# Patient Record
Sex: FEMALE | Race: WHITE | Employment: OTHER | URBAN - METROPOLITAN AREA
[De-identification: names, ages, dates, MRNs, and addresses within clinical notes are randomized per-mention and may not be internally consistent; named-entity substitution may affect disease eponyms.]

---

## 2017-10-08 ENCOUNTER — HOSPITAL ENCOUNTER (EMERGENCY)
Age: 82
Discharge: HOME | End: 2017-10-08
Attending: EMERGENCY MEDICINE
Payer: MEDICARE

## 2017-10-08 ENCOUNTER — APPOINTMENT (OUTPATIENT)
Dept: CT IMAGING | Age: 82
End: 2017-10-08
Attending: EMERGENCY MEDICINE
Payer: MEDICARE

## 2017-10-08 ENCOUNTER — APPOINTMENT (OUTPATIENT)
Dept: GENERAL RADIOLOGY | Age: 82
End: 2017-10-08
Attending: EMERGENCY MEDICINE
Payer: MEDICARE

## 2017-10-08 VITALS
SYSTOLIC BLOOD PRESSURE: 161 MMHG | DIASTOLIC BLOOD PRESSURE: 65 MMHG | TEMPERATURE: 97.9 F | HEART RATE: 68 BPM | OXYGEN SATURATION: 98 % | HEIGHT: 59 IN | BODY MASS INDEX: 24.19 KG/M2 | RESPIRATION RATE: 20 BRPM | WEIGHT: 120 LBS

## 2017-10-08 DIAGNOSIS — S61.411A LACERATION OF RIGHT HAND WITHOUT FOREIGN BODY, INITIAL ENCOUNTER: ICD-10-CM
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
## 2017-10-08 DIAGNOSIS — S70.12XA CONTUSION OF LEFT THIGH, INITIAL ENCOUNTER: ICD-10-CM

## 2017-10-08 DIAGNOSIS — S01.01XA LACERATION OF SCALP, INITIAL ENCOUNTER: ICD-10-CM

## 2017-10-08 PROCEDURE — 75810000293 HC SIMP/SUPERF WND  RPR

## 2017-10-08 PROCEDURE — 77030002916 HC SUT ETHLN J&J -A

## 2017-10-08 PROCEDURE — 70450 CT HEAD/BRAIN W/O DYE: CPT

## 2017-10-08 PROCEDURE — 74011000250 HC RX REV CODE- 250: Performed by: EMERGENCY MEDICINE

## 2017-10-08 PROCEDURE — 74011250637 HC RX REV CODE- 250/637: Performed by: EMERGENCY MEDICINE

## 2017-10-08 PROCEDURE — 99284 EMERGENCY DEPT VISIT MOD MDM: CPT

## 2017-10-08 PROCEDURE — 73552 X-RAY EXAM OF FEMUR 2/>: CPT

## 2017-10-08 PROCEDURE — 77030008460 HC STPLR SKN PRECIS 3M -A

## 2017-10-08 RX ORDER — LIDOCAINE HYDROCHLORIDE 20 MG/ML
10 INJECTION, SOLUTION EPIDURAL; INFILTRATION; INTRACAUDAL; PERINEURAL
Status: COMPLETED | OUTPATIENT
Start: 2017-10-08 | End: 2017-10-08

## 2017-10-08 RX ADMIN — BACITRACIN ZINC, NEOMYCIN SULFATE, POLYMYXIN B SULFATE 1 PACKET: 3.5; 5000; 4 OINTMENT TOPICAL at 23:05

## 2017-10-08 RX ADMIN — Medication 2 ML: at 22:05

## 2017-10-08 RX ADMIN — LIDOCAINE HYDROCHLORIDE 200 MG: 20 INJECTION, SOLUTION EPIDURAL; INFILTRATION; INTRACAUDAL; PERINEURAL at 22:05

## 2017-10-09 NOTE — DISCHARGE INSTRUCTIONS
We hope that we have addressed all of your medical concerns. The examination and treatment you received in the Emergency Department were for an emergent problem and were not intended as complete care. It is important that you follow up with your healthcare provider(s) for ongoing care. If your symptoms worsen or do not improve as expected, and you are unable to reach your usual health care provider(s), you should return to the Emergency Department. Today's healthcare is undergoing tremendous change, and patient satisfaction surveys are one of the many tools to assess the quality of medical care. You may receive a survey from the [place] regarding your experience in the Emergency Department. I hope that your experience has been completely positive, particularly the medical care that I provided. As such, please participate in the survey; anything less than excellent does not meet my expectations or intentions. [facility] and [address] [place] [address] participate in nationally recognized quality of care measures. If your blood pressure is greater than 120/80, as reported below, we urge that you seek medical care to address the potential of high blood pressure, commonly known as hypertension. Hypertension can be hereditary or can be caused by certain medical conditions, pain, stress, or \"white coat syndrome. \"       Please make an appointment with your health care provider(s) for follow up of your Emergency Department visit. VITALS:   Patient Vitals for the past 8 hrs:   Temp Pulse Resp BP SpO2   10/08/17 2112 97.9 °F (36.6 °C) 68 20 161/65 98 %          Thank you for allowing us to provide you with medical care today. We realize that you have many choices for your emergency care needs. Please choose us in the future for any continued health care needs. Jaime Mcguire, [address]. Office: [phone]            No results found for this or any previous visit (from the past 24 hour(s)). Xr Femur Lt 2 V    Result Date: 10/8/2017  EXAM:  XR FEMUR LT 2 V Clinical history: Fall INDICATION:   fall. COMPARISON: None. FINDINGS: Two views of the left femur demonstrate no fracture or other acute osseous, articular or soft tissue abnormality. IMPRESSION:  No acute abnormality. Ct Head Wo Cont    Result Date: 10/8/2017  EXAM:  CT HEAD WO CONT INDICATION: Imbalance, fall, head injury, occipital scalp laceration. COMPARISON: CT head on 11/15/2015. TECHNIQUE: Noncontrast head CT. Coronal and sagittal reformats. CT dose reduction was achieved through the use of a standardized protocol tailored for this examination and automatic exposure control for dose modulation. FINDINGS: The ventricles and sulci are age-appropriate without hydrocephalus. There is no mass effect or midline shift. There is no intracranial hemorrhage or extra-axial fluid collection. Hypoattenuation in the right frontal lobe. Sylvian region does not include loss of gray-white differentiation. Finding is new since 2015. No other evidence of infarct. The calvarium is intact. The visualized paranasal sinuses and mastoid air cells are clear. IMPRESSION: 1. No intracranial hemorrhage. 2. 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 Preventing Falls: Care Instructions  Your Care Instructions  Getting around your home safely can be a challenge if you have injuries or health problems that make it easy for you to fall. Loose rugs and furniture in walkways are among the dangers for many older people who have problems walking or who have poor eyesight. People who have conditions such as arthritis, osteoporosis, or dementia also have to be careful not to fall. You can make your home safer with a few simple measures. Follow-up care is a key part of your treatment and safety.  Be sure to make and go to all appointments, and call your doctor if you are having problems. It's also a good idea to know your test results and keep a list of the medicines you take. How can you care for yourself at home? Taking care of yourself  · You may get dizzy if you do not drink enough water. To prevent dehydration, drink plenty of fluids, enough so that your urine is light yellow or clear like water. Choose water and other caffeine-free clear liquids. If you have kidney, heart, or liver disease and have to limit fluids, talk with your doctor before you increase the amount of fluids you drink. · Exercise regularly to improve your strength, muscle tone, and balance. Walk if you can. Swimming may be a good choice if you cannot walk easily. · Have your vision and hearing checked each year or any time you notice a change. If you have trouble seeing and hearing, you might not be able to avoid objects and could lose your balance. · Know the side effects of the medicines you take. Ask your doctor or pharmacist whether the medicines you take can affect your balance. Sleeping pills or sedatives can affect your balance. · Limit the amount of alcohol you drink. Alcohol can impair your balance and other senses. · Ask your doctor whether calluses or corns on your feet need to be removed. If you wear loose-fitting shoes because of calluses or corns, you can lose your balance and fall. · Talk to your doctor if you have numbness in your feet. Preventing falls at home  · Remove raised doorway thresholds, throw rugs, and clutter. Repair loose carpet or raised areas in the floor. · Move furniture and electrical cords to keep them out of walking paths. · Use nonskid floor wax, and wipe up spills right away, especially on ceramic tile floors. · If you use a walker or cane, put rubber tips on it. If you use crutches, clean the bottoms of them regularly with an abrasive pad, such as steel wool.   · Keep your house well lit, especially stairways, porches, and outside walkways. Use night-lights in areas such as hallways and bathrooms. Add extra light switches or use remote switches (such as switches that go on or off when you clap your hands) to make it easier to turn lights on if you have to get up during the night. · Install sturdy handrails on stairways. · Move items in your cabinets so that the things you use a lot are on the lower shelves (about waist level). · Keep a cordless phone and a flashlight with new batteries by your bed. If possible, put a phone in each of the main rooms of your house, or carry a cell phone in case you fall and cannot reach a phone. Or, you can wear a device around your neck or wrist. You push a button that sends a signal for help. · Wear low-heeled shoes that fit well and give your feet good support. Use footwear with nonskid soles. Check the heels and soles of your shoes for wear. Repair or replace worn heels or soles. · Do not wear socks without shoes on wood floors. · Walk on the grass when the sidewalks are slippery. If you live in an area that gets snow and ice in the winter, sprinkle salt on slippery steps and sidewalks. Preventing falls in the bath  · Install grab bars and nonskid mats inside and outside your shower or tub and near the toilet and sinks. · Use shower chairs and bath benches. · Use a hand-held shower head that will allow you to sit while showering. · Get into a tub or shower by putting the weaker leg in first. Get out of a tub or shower with your strong side first.  · Repair loose toilet seats and consider installing a raised toilet seat to make getting on and off the toilet easier. · Keep your bathroom door unlocked while you are in the shower. Where can you learn more? Go to http://fritz-darrell.info/. Enter 0476 [id number] in the search box to learn more about \"Preventing Falls: Care Instructions. \"  Current as of: August 4, 2016  Content Version: 11.3  © 4498-5244 [employer]. Care instructions adapted under license by [employer] (which disclaims liability or warranty for this information). If you have questions about a medical condition or this instruction, always ask your healthcare professional. [address] any warranty or liability for your use of this information. Cuts on the Hand Closed With Stitches: Care Instructions  Your Care Instructions    A cut on your hand can be on your fingers, your thumb, or the front or back of your hand. Sometimes a cut can injure the tendons, blood vessels, or nerves of your hand. The doctor used stitches to close the cut. Using stitches also helps the cut heal and reduces scarring. The doctor may have given you a splint to help prevent you from moving your hand, fingers, or thumb. If the cut went deep and through the skin, the doctor put in two layers of stitches. The deeper layer brings the deep part of the cut together. These stitches will dissolve and don't need to be removed. The stitches in the upper layer are the ones you see on the cut. You will probably have a bandage. You will need to have the stitches removed, usually in 7 to 14 days. The doctor may suggest that you see a hand specialist if the cut is very deep or if you have trouble moving your fingers or have less feeling in your hand. The doctor has checked you carefully, but problems can develop later. If you notice any problems or new symptoms, get medical treatment right away. Follow-up care is a key part of your treatment and safety. Be sure to make and go to all appointments, and call your doctor if you are having problems. It's also a good idea to know your test results and keep a list of the medicines you take. How can you care for yourself at home? · Keep the cut dry for the first 24 to 48 hours. After this, you can shower if your doctor okays it. Pat the cut dry.   · Don't soak the cut, such as in a bathtub. Your doctor will tell you when it's safe to get the cut wet. · If your doctor told you how to care for your cut, follow your doctor's instructions. If you did not get instructions, follow this general advice:  ¨ After the first 24 to 48 hours, wash around the cut with clean water 2 times a day. Don't use hydrogen peroxide or alcohol, which can slow healing. ¨ You may cover the cut with a thin layer of petroleum jelly, such as Vaseline, and a nonstick bandage. ¨ Apply more petroleum jelly and replace the bandage as needed. · Prop up the sore hand on a pillow anytime you sit or lie down during the next 3 days. Try to keep it above the level of your heart. This will help reduce swelling. · Avoid any activity that could cause your cut to reopen. · Do not remove the stitches on your own. Your doctor will tell you when to come back to have the stitches removed. · Be safe with medicines. Take pain medicines exactly as directed. ¨ If the doctor gave you a prescription medicine for pain, take it as prescribed. ¨ If you are not taking a prescription pain medicine, ask your doctor if you can take an over-the-counter medicine. When should you call for help? Call your doctor now or seek immediate medical care if:  · You have new pain, or your pain gets worse. · The skin near the cut is cold or pale or changes color. · You have tingling, weakness, or numbness near the cut. · The cut starts to bleed, and blood soaks through the bandage. Oozing small amounts of blood is normal.  · You have trouble moving the area of the hand near the cut. · You have symptoms of infection, such as:  ¨ Increased pain, swelling, warmth, or redness around the cut. ¨ Red streaks leading from the cut. ¨ Pus draining from the cut. ¨ A fever. Watch closely for changes in your health, and be sure to contact your doctor if:  · You do not get better as expected. Where can you learn more?   Go to http://fritz-darrell.info/. Enter T250 in the search box to learn more about \"Cuts on the Hand Closed With Stitches: Care Instructions. \"  Current as of: March 20, 2017  Content Version: 11.3  © 6485-2514 [employer]. Care instructions adapted under license by [employer] (which disclaims liability or warranty for this information). If you have questions about a medical condition or this instruction, always ask your healthcare professional. [address] any warranty or liability for your use of this information. Cuts Closed With Staples: Care Instructions  Your Care Instructions  A cut can happen anywhere on your body. The doctor used staples to close the cut. Staples easily and quickly close a cut, which helps the cut heal.  Sometimes a cut can injure tendons, blood vessels, or nerves. If the cut went deep and through the skin, the doctor may have put in a layer of stitches below the staples. The deeper layer of stitches brings the deep part of the cut together. These stitches will dissolve and don't need to be removed. The staples in the upper layer are what you see on the cut. You may have a bandage. You will need to have the staples removed, usually in 7 to 14 days. The doctor has checked you carefully, but problems can develop later. If you notice any problems or new symptoms, get medical treatment right away. Follow-up care is a key part of your treatment and safety. Be sure to make and go to all appointments, and call your doctor if you are having problems. It's also a good idea to know your test results and keep a list of the medicines you take. How can you care for yourself at home? · Keep the cut dry for the first 24 to 48 hours. After this, you can shower if your doctor okays it. Pat the cut dry. · Don't soak the cut, such as in a bathtub. Your doctor will tell you when it's safe to get the cut wet.   · If your doctor told you how to care for your cut, follow your doctor's instructions. If you did not get instructions, follow this general advice:  ¨ After the first 24 to 48 hours, wash around the cut with clean water 2 times a day. Don't use hydrogen peroxide or alcohol, which can slow healing. ¨ You may cover the cut with a thin layer of petroleum jelly, such as Vaseline, and a nonstick bandage. ¨ Apply more petroleum jelly and replace the bandage as needed. · Avoid any activity that could cause your cut to reopen. · Do not remove the staples on your own. Your doctor will tell you when to come back to have the staples removed. · Take pain medicines exactly as directed. ¨ If the doctor gave you a prescription medicine for pain, take it as prescribed. ¨ If you are not taking a prescription pain medicine, ask your doctor if you can take an over-the-counter medicine. When should you call for help? Call your doctor now or seek immediate medical care if:  · You have new pain, or your pain gets worse. · The skin near the cut is cold or pale or changes color. · You have tingling, weakness, or numbness near the cut. · The cut starts to bleed, and blood soaks through the bandage. Oozing small amounts of blood is normal.  · You have trouble moving the area near the cut. · You have symptoms of infection, such as:  ¨ Increased pain, swelling, warmth, or redness around the cut. ¨ Red streaks leading from the cut. ¨ Pus draining from the cut. ¨ A fever. Watch closely for changes in your health, and be sure to contact your doctor if:  · You do not get better as expected. Where can you learn more? Go to http://fritz-darrell.info/. Enter Q840 in the search box to learn more about \"Cuts Closed With Staples: Care Instructions. \"  Current as of: March 20, 2017  Content Version: 11.3  © 3206-6982 [employer].  Care instructions adapted under license by [employer] (which disclaims liability or warranty for this information). If you have questions about a medical condition or this instruction, always ask your healthcare professional. [place] [address] any warranty or liability for your use of this information.

## 2017-10-09 NOTE — ED TRIAGE NOTES
Pt lost balance and hit head. Laceration to back of head and to right hand. MD at bedside to examine pt. Tenderness to left leg.

## 2017-10-09 NOTE — ED TRIAGE NOTES
Patient arrives with bleeding to scalp and right wrist after fall.  Patient was taken to room for triage

## 2017-10-09 NOTE — ED NOTES
Discharge instructions given by provider to son. Son verbalized an understanding, has no further questions at this time.

## 2017-10-09 NOTE — ED PROVIDER NOTES
HPI Comments: 80 y.o. female with past medical history significant for diverticulitis, HTN, anxiety, dementia, who presents accompanied by family member from memory care facility, for evaluation after a ground level fall that occurred PTA. Son reports that pt was \"dancing\" with another resident at the [facility] where she lives. Pt lost her balance and fell, and now has wounds to the posterior scalp and ulnar aspect of the right hand. Pt has been able to ambulate since the fall occurred, but she is complaining of pain upon palpation of the left upper leg. Son denies pt taking any medications for anticoagulation. There are no other known injuries. Social hx: - Tobacco, - EtOH, - Illicit Drug Abuse   Patient is a resident of a memory care facility. PCP: Taryn Munson MD    Full history, physical exam, and ROS unable to be obtained due to:  dementia. Note written by Cat Velazquez. Tom Frey, as dictated by Rudy Cruz, DO 9:13 PM      The history is provided by the patient and a relative. The history is limited by the condition of the patient (h/o dementia). No  was used. Past Medical History:   Diagnosis Date    Diverticulitis     Gastrointestinal disorder diverticulitis    Hypertension     Psychiatric disorder     anxiety       Past Surgical History:   Procedure Laterality Date    ABDOMEN SURGERY PROC UNLISTED  unable to recall    colon resection         No family history on file. Social History     Social History    Marital status:      Spouse name: N/A    Number of children: N/A    Years of education: N/A     Occupational History    Not on file.      Social History Main Topics    Smoking status: Never Smoker    Smokeless tobacco: Not on file    Alcohol use No    Drug use: No    Sexual activity: Not Currently     Other Topics Concern    Not on file     Social History Narrative     ALLERGIES: Dilaudid [hydromorphone (bulk)]; Keflex [cephalexin]; and Morphine    Review of Systems   Unable to perform ROS: Dementia       Vitals:    10/08/17 2112   BP: 161/65   Pulse: 68   Resp: 20   Temp: 97.9 °F (36.6 °C)   SpO2: 98%   Weight: 54.4 kg (120 lb)   Height: 4' 11\" (1.499 m)            Physical Exam   Constitutional: She appears well-developed and well-nourished. No distress. HENT:   Head: Normocephalic. Head is with laceration. Right Ear: External ear normal.   Left Ear: External ear normal.   Nose: Nose normal.   Mouth/Throat: Oropharynx is clear and moist. No oropharyngeal exudate. Eyes: Conjunctivae and EOM are normal. Pupils are equal, round, and reactive to light. Right eye exhibits no discharge. Left eye exhibits no discharge. No scleral icterus. Neck: Normal range of motion. Neck supple. No JVD present. No tracheal deviation present. Cardiovascular: Normal rate, regular rhythm, normal heart sounds and intact distal pulses. Exam reveals no gallop and no friction rub. No murmur heard. Pulmonary/Chest: Effort normal and breath sounds normal. No stridor. No respiratory distress. She has no decreased breath sounds. She has no wheezes. She has no rhonchi. She has no rales. She exhibits no tenderness. Abdominal: Soft. Bowel sounds are normal. She exhibits no distension. There is no tenderness. There is no rebound and no guarding. Musculoskeletal: Normal range of motion. She exhibits no edema. Right hand: She exhibits laceration (to ulnar aspect of right hand). Hands:       Left upper leg: She exhibits tenderness (tenderness to left thigh). Neurological: She is alert. She has normal strength and normal reflexes. No cranial nerve deficit or sensory deficit. She exhibits normal muscle tone. Coordination normal. GCS eye subscore is 4. GCS verbal subscore is 5. GCS motor subscore is 6. She is disoriented (per son patient is at baseline)   Skin: Skin is warm and dry. No rash noted. She is not diaphoretic.  No erythema. No pallor. Psychiatric: She has a normal mood and affect. Her behavior is normal. Judgment and thought content normal.   Nursing note and vitals reviewed. Note written by Nelson Aldridge. Edgar Person, as dictated by Marcel Jean Baptiste DO 9:13 PM      MDM  Number of Diagnoses or Management Options  Contusion of left thigh, initial encounter:   Fall, initial encounter:   Laceration of right hand without foreign body, initial encounter:   Laceration of scalp, initial encounter:     ED Course       Wound Repair  Date/Time: 10/8/2017 10:25 PM  Performed by: attendingPreparation: skin prepped with Betadine  Pre-procedure re-eval: Immediately prior to the procedure, the patient was reevaluated and found suitable for the planned procedure and any planned medications. Time out: Immediately prior to the procedure a time out was called to verify the correct patient, procedure, equipment, staff and marking as appropriate. .  Location details: scalp  Wound length:2.5 cm or less (1 cm)  Anesthesia: see MAR for details    Anesthesia:  Local Anesthetic: LET (lido,epi,tetracaine)  Anesthetic total: 2 mL  Foreign bodies: no foreign bodies  Debridement: none  Skin closure: staples  Number of sutures: 3  Approximation: close  Dressing: antibiotic ointment  Patient tolerance: Patient tolerated the procedure well with no immediate complications  My total time at bedside, performing this procedure was 1-15 minutes (10 minutes).   Wound Repair  Date/Time: 10/8/2017 10:35 PM  Performed by: attendingPreparation: skin prepped with Betadine  Location details: right hand  Wound length:2.5 cm or less (2 cm)  Anesthesia: local infiltration    Anesthesia:  Local Anesthetic: lidocaine 2% without epinephrine  Anesthetic total: 2 mL  Foreign bodies: no foreign bodies  Irrigation solution: saline  Debridement: none  Skin closure: 5-0 nylon  Number of sutures: 4  Technique: simple and interrupted  Approximation: close  Dressing: antibiotic ointment  Patient tolerance: Patient tolerated the procedure well with no immediate complications  My total time at bedside, performing this procedure was 1-15 minutes (14 minutes). Chief Complaint   Patient presents with    Head Laceration    Hand Laceration       12:08 AM  The patients presenting problems have been discussed, and they are in agreement with the care plan formulated and outlined with them. I have encouraged them to ask questions as they arise throughout their visit. MEDICATIONS GIVEN:  Medications   lidocaine (PF) (XYLOCAINE) 20 mg/mL (2 %) injection 200 mg (200 mg IntraDERMal Given 10/8/17 2205)   lidocaine/EPINEPHrine/tetracaine/methylcellulose (LET) topical gel gel 2 mL (2 mL Topical Given 10/8/17 2205)   neomycin-bacitracnZn-polymyxnB (NEOSPORIN) ointment 1 Packet (1 Packet Topical Given 10/8/17 2305)       LABS REVIEWED:  No results found for this or any previous visit (from the past 24 hour(s)). VITAL SIGNS:  Patient Vitals for the past 12 hrs:   Temp Pulse Resp BP SpO2   10/08/17 2112 97.9 °F (36.6 °C) 68 20 161/65 98 %       RADIOLOGY RESULTS:  The following have been ordered and reviewed:  XR FEMUR LT 2 V   Final Result      CT HEAD WO CONT   Final Result        Study Result      EXAM:  CT HEAD WO CONT     INDICATION: Imbalance, fall, head injury, occipital scalp laceration.     COMPARISON: CT head on 11/15/2015.     TECHNIQUE: Noncontrast head CT. Coronal and sagittal reformats. CT dose  reduction was achieved through the use of a standardized protocol tailored for  this examination and automatic exposure control for dose modulation.     FINDINGS: The ventricles and sulci are age-appropriate without hydrocephalus. There is no mass effect or midline shift. There is no intracranial hemorrhage or  extra-axial fluid collection. Hypoattenuation in the right frontal lobe. Sylvian  region does not include loss of gray-white differentiation. Finding is new since  2015. No other evidence of infarct.     The calvarium is intact. The visualized paranasal sinuses and mastoid air cells  are clear.     IMPRESSION  IMPRESSION:   1. No intracranial hemorrhage. 2. Age-indeterminate right frontal lobe infarct is new since 2015 but most  likely nonacute. Study Result      EXAM:  XR FEMUR LT 2 V  Clinical history: Fall     INDICATION:   fall.     COMPARISON: None.     FINDINGS: Two views of the left femur demonstrate no fracture or other acute  osseous, articular or soft tissue abnormality.     IMPRESSION  IMPRESSION:  No acute abnormality. PROGRESS NOTES:  Discussed results and plan with patient's son. Patient will be discharged back to nursing facility with PCP followup. Patient instructed to return to the emergency room for any worsening symptoms or any other concerns. DIAGNOSIS:    1. Fall, initial encounter    2. Laceration of right hand without foreign body, initial encounter    3. Contusion of left thigh, initial encounter    4. Laceration of scalp, initial encounter        PLAN:  Follow-up Information     Follow up With Details Comments [address], [place] In 1 week For suture removal [address] [place] [address]      [facility] DEPT  If symptoms worsen [facility]  [phone]        Discharge Medication List as of 10/8/2017 11:01 PM      CONTINUE these medications which have NOT CHANGED    Details   !! QUEtiapine (SEROQUEL) 25 mg tablet Take 37.5 mg by mouth nightly., Historical Med      LORazepam (ATIVAN) 0.5 mg tablet Take 1 mg by mouth daily as needed for Anxiety. , Historical Med      bisacodyl (BISCOLAX) 10 mg suppository Insert 10 mg into rectum daily as needed., Historical Med      docusate sodium (COLACE) 100 mg capsule Take 100 mg by mouth two (2) times a day., Historical Med      glycerin, Adult, suppository Insert 1 Suppository into rectum daily as needed., Historical Med      hydrOXYzine (ATARAX) 25 mg tablet Take 12.5 mg by mouth daily. , Historical Med      alum-mag hydroxide-simeth (MAALOX ADVANCED) 200-200-20 mg/5 mL susp Take 15 mL by mouth four (4) times daily as needed (Indigestion). , Historical Med      !! QUEtiapine (SEROQUEL) 25 mg tablet Take 12.5 mg by mouth two (2) times a day. Dementia with behaviors, Historical Med      therapeutic multivitamin (THERA) tablet Take 1 Tab by mouth daily. , Historical Med      neomycin-bacitracin-polymyxin (TRIPLE ANTIBIOTIC) 3.5-400-5,000 mg-unit-unit/g ointment Apply  to affected area daily as needed., Historical Med      acetaminophen (ACETAMINOPHEN EXTRA STRENGTH) 500 mg tablet Take 500 mg by mouth as needed for Pain., Historical Med      cholecalciferol (VITAMIN D3) 1,000 unit tablet Take 1,000 Units by mouth daily. , Historical Med      ranitidine (ZANTAC) 150 mg tablet Take 150 mg by mouth two (2) times a day., Historical Med      polyethylene glycol (MIRALAX) 17 gram packet Take 17 g by mouth two (2) times a day.  , Historical Med      aspirin 81 mg chewable tablet Take 81 mg by mouth daily. , Historical Med       !! - Potential duplicate medications found. Please discuss with provider. ED COURSE: The patients hospital course has been uncomplicated.

## 2018-03-24 ENCOUNTER — HOSPITAL ENCOUNTER (EMERGENCY)
Age: 83
Discharge: NURSING, REHAB OR LONG-TERM CARE | End: 2018-03-25
Attending: EMERGENCY MEDICINE | Admitting: EMERGENCY MEDICINE
Payer: MEDICARE

## 2018-03-24 ENCOUNTER — APPOINTMENT (OUTPATIENT)
Dept: GENERAL RADIOLOGY | Age: 83
End: 2018-03-24
Attending: EMERGENCY MEDICINE
Payer: MEDICARE

## 2018-03-24 VITALS
TEMPERATURE: 97.7 F | OXYGEN SATURATION: 95 % | RESPIRATION RATE: 19 BRPM | SYSTOLIC BLOOD PRESSURE: 192 MMHG | WEIGHT: 120 LBS | HEART RATE: 65 BPM | DIASTOLIC BLOOD PRESSURE: 80 MMHG | BODY MASS INDEX: 24.24 KG/M2

## 2018-03-24 DIAGNOSIS — S52.022A CLOSED FRACTURE OF LEFT OLECRANON PROCESS, INITIAL ENCOUNTER: Primary | ICD-10-CM

## 2018-03-24 DIAGNOSIS — S51.019A SKIN TEAR OF ELBOW WITHOUT COMPLICATION, INITIAL ENCOUNTER: ICD-10-CM

## 2018-03-24 PROCEDURE — 73080 X-RAY EXAM OF ELBOW: CPT

## 2018-03-24 PROCEDURE — 75810000053 HC SPLINT APPLICATION

## 2018-03-24 PROCEDURE — 74011250637 HC RX REV CODE- 250/637: Performed by: EMERGENCY MEDICINE

## 2018-03-24 PROCEDURE — 99282 EMERGENCY DEPT VISIT SF MDM: CPT

## 2018-03-24 PROCEDURE — 77030028224 HC PDNG CST BSNM -A

## 2018-03-24 RX ORDER — DOXYCYCLINE HYCLATE 100 MG
100 TABLET ORAL 2 TIMES DAILY
Qty: 10 TAB | Refills: 0 | Status: SHIPPED | OUTPATIENT
Start: 2018-03-24 | End: 2018-03-24

## 2018-03-24 RX ORDER — DOXYCYCLINE 25 MG/5ML
100 POWDER, FOR SUSPENSION ORAL EVERY 12 HOURS
Qty: 200 ML | Refills: 0 | Status: SHIPPED | OUTPATIENT
Start: 2018-03-24 | End: 2018-03-29

## 2018-03-24 RX ORDER — DOXYCYCLINE HYCLATE 100 MG
100 TABLET ORAL
Status: DISCONTINUED | OUTPATIENT
Start: 2018-03-24 | End: 2018-03-25 | Stop reason: HOSPADM

## 2018-03-25 ENCOUNTER — HOSPITAL ENCOUNTER (EMERGENCY)
Age: 83
Discharge: HOME | End: 2018-03-25
Attending: EMERGENCY MEDICINE
Payer: MEDICARE

## 2018-03-25 ENCOUNTER — APPOINTMENT (OUTPATIENT)
Dept: CT IMAGING | Age: 83
End: 2018-03-25
Attending: EMERGENCY MEDICINE
Payer: MEDICARE

## 2018-03-25 VITALS
DIASTOLIC BLOOD PRESSURE: 47 MMHG | HEIGHT: 59 IN | WEIGHT: 120 LBS | BODY MASS INDEX: 24.19 KG/M2 | TEMPERATURE: 98.4 F | RESPIRATION RATE: 16 BRPM | SYSTOLIC BLOOD PRESSURE: 175 MMHG | HEART RATE: 62 BPM | OXYGEN SATURATION: 97 %

## 2018-03-25 DIAGNOSIS — R42 DIZZY SPELLS: Primary | ICD-10-CM

## 2018-03-25 LAB
ALBUMIN SERPL-MCNC: 3.2 G/DL (ref 3.5–5)
ALBUMIN/GLOB SERPL: 0.7 {RATIO} (ref 1.1–2.2)
ALP SERPL-CCNC: 81 U/L (ref 45–117)
ALT SERPL-CCNC: 24 U/L (ref 12–78)
ANION GAP SERPL CALC-SCNC: 9 MMOL/L (ref 5–15)
AST SERPL-CCNC: 46 U/L (ref 15–37)
BASOPHILS # BLD: 0.1 K/UL (ref 0–0.1)
BASOPHILS NFR BLD: 1 % (ref 0–1)
BILIRUB SERPL-MCNC: 0.5 MG/DL (ref 0.2–1)
BUN SERPL-MCNC: 17 MG/DL (ref 6–20)
BUN/CREAT SERPL: 17 (ref 12–20)
CALCIUM SERPL-MCNC: 9 MG/DL (ref 8.5–10.1)
CHLORIDE SERPL-SCNC: 104 MMOL/L (ref 97–108)
CO2 SERPL-SCNC: 25 MMOL/L (ref 21–32)
CREAT SERPL-MCNC: 0.98 MG/DL (ref 0.55–1.02)
DIFFERENTIAL METHOD BLD: ABNORMAL
EOSINOPHIL # BLD: 0.2 K/UL (ref 0–0.4)
EOSINOPHIL NFR BLD: 2 % (ref 0–7)
ERYTHROCYTE [DISTWIDTH] IN BLOOD BY AUTOMATED COUNT: 13.9 % (ref 11.5–14.5)
GLOBULIN SER CALC-MCNC: 4.3 G/DL (ref 2–4)
GLUCOSE SERPL-MCNC: 144 MG/DL (ref 65–100)
HCT VFR BLD AUTO: 34.3 % (ref 35–47)
HGB BLD-MCNC: 11.1 G/DL (ref 11.5–16)
IMM GRANULOCYTES # BLD: 0 K/UL (ref 0–0.04)
IMM GRANULOCYTES NFR BLD AUTO: 0 % (ref 0–0.5)
LYMPHOCYTES # BLD: 1.1 K/UL (ref 0.8–3.5)
LYMPHOCYTES NFR BLD: 12 % (ref 12–49)
MCH RBC QN AUTO: 28.5 PG (ref 26–34)
MCHC RBC AUTO-ENTMCNC: 32.4 G/DL (ref 30–36.5)
MCV RBC AUTO: 87.9 FL (ref 80–99)
MONOCYTES # BLD: 0.6 K/UL (ref 0–1)
MONOCYTES NFR BLD: 7 % (ref 5–13)
NEUTS SEG # BLD: 6.9 K/UL (ref 1.8–8)
NEUTS SEG NFR BLD: 78 % (ref 32–75)
NRBC # BLD: 0 K/UL (ref 0–0.01)
NRBC BLD-RTO: 0 PER 100 WBC
PLATELET # BLD AUTO: 267 K/UL (ref 150–400)
PMV BLD AUTO: 10.6 FL (ref 8.9–12.9)
POTASSIUM SERPL-SCNC: 5.4 MMOL/L (ref 3.5–5.1)
PROT SERPL-MCNC: 7.5 G/DL (ref 6.4–8.2)
RBC # BLD AUTO: 3.9 M/UL (ref 3.8–5.2)
SODIUM SERPL-SCNC: 138 MMOL/L (ref 136–145)
TROPONIN I SERPL-MCNC: <0.04 NG/ML
WBC # BLD AUTO: 8.9 K/UL (ref 3.6–11)

## 2018-03-25 PROCEDURE — 96365 THER/PROPH/DIAG IV INF INIT: CPT

## 2018-03-25 PROCEDURE — 74011000258 HC RX REV CODE- 258: Performed by: EMERGENCY MEDICINE

## 2018-03-25 PROCEDURE — 36415 COLL VENOUS BLD VENIPUNCTURE: CPT | Performed by: EMERGENCY MEDICINE

## 2018-03-25 PROCEDURE — 70450 CT HEAD/BRAIN W/O DYE: CPT

## 2018-03-25 PROCEDURE — 74011250636 HC RX REV CODE- 250/636: Performed by: EMERGENCY MEDICINE

## 2018-03-25 PROCEDURE — 80053 COMPREHEN METABOLIC PANEL: CPT | Performed by: EMERGENCY MEDICINE

## 2018-03-25 PROCEDURE — 84484 ASSAY OF TROPONIN QUANT: CPT | Performed by: EMERGENCY MEDICINE

## 2018-03-25 PROCEDURE — 99284 EMERGENCY DEPT VISIT MOD MDM: CPT

## 2018-03-25 PROCEDURE — 85025 COMPLETE CBC W/AUTO DIFF WBC: CPT | Performed by: EMERGENCY MEDICINE

## 2018-03-25 PROCEDURE — 93005 ELECTROCARDIOGRAM TRACING: CPT

## 2018-03-25 PROCEDURE — 75810000053 HC SPLINT APPLICATION

## 2018-03-25 PROCEDURE — 74011000250 HC RX REV CODE- 250: Performed by: EMERGENCY MEDICINE

## 2018-03-25 PROCEDURE — 96366 THER/PROPH/DIAG IV INF ADDON: CPT

## 2018-03-25 RX ADMIN — DOXYCYCLINE 100 MG: 100 INJECTION, POWDER, LYOPHILIZED, FOR SOLUTION INTRAVENOUS at 09:19

## 2018-03-25 RX ADMIN — SODIUM CHLORIDE 500 ML: 900 INJECTION, SOLUTION INTRAVENOUS at 09:18

## 2018-03-25 NOTE — ED PROVIDER NOTES
HPI Comments: Dionicio Campos is a 79 yo F who was seen here last night for left olecranon process fracture. She has dementia and is a patient at the Memory center. Staff there states that she was agitated this morning, walking around and calling for help. They thought that she may have been feeling unsteady and lowered her to the floor. Once they did that, she did not respond to them for 10 minutes. PAtient is now awake. Says she feels fine and wants to go home.  also reports that she had refused to andre her doxcycline. Will order IV dose here. Past Medical History:   Diagnosis Date    Diverticulitis     Gastrointestinal disorder diverticulitis    Hypertension     Psychiatric disorder     anxiety       Past Surgical History:   Procedure Laterality Date    ABDOMEN SURGERY PROC UNLISTED  unable to recall    colon resection         No family history on file. Social History     Social History    Marital status:      Spouse name: N/A    Number of children: N/A    Years of education: N/A     Occupational History    Not on file. Social History Main Topics    Smoking status: Never Smoker    Smokeless tobacco: Not on file    Alcohol use No    Drug use: No    Sexual activity: Not Currently     Other Topics Concern    Not on file     Social History Narrative         ALLERGIES: Dilaudid [hydromorphone (bulk)]; Keflex [cephalexin]; and Morphine    Review of Systems   Unable to perform ROS: Dementia       Vitals:    03/25/18 0548 03/25/18 0725 03/25/18 0735 03/25/18 0750   BP: (!) 129/97 168/73 160/58 175/47   Pulse: 62      Resp: 16      Temp: 98.4 °F (36.9 °C)      SpO2: 99% 96% 96% 98%   Weight: 54.4 kg (120 lb)      Height: 4' 11\" (1.499 m)               Physical Exam   Constitutional: She appears well-developed and well-nourished. No distress. HENT:   Head: Normocephalic and atraumatic.    Mouth/Throat: Oropharynx is clear and moist.   Eyes: Conjunctivae and EOM are normal.   Neck: Normal range of motion and phonation normal.   Cardiovascular: Normal rate and intact distal pulses. Pulmonary/Chest: Effort normal. No respiratory distress. She has no wheezes. She has no rales. Abdominal: She exhibits no distension. There is no tenderness. There is no rigidity, no rebound and no guarding. Musculoskeletal: Normal range of motion. She exhibits no tenderness. Left arm in splint   Neurological: She is alert. She is not disoriented. She exhibits normal muscle tone. Skin: Skin is warm and dry. Nursing note and vitals reviewed. ED EKG interpretation:  Rhythm: normal sinus rhythm; and regular . Rate (approx.): 61; Axis: normal; P wave: normal; QRS interval: normal ; ST/T wave: T wave inverted; in  Lead: V5  and V6 ; Other findings: abnormal ekg. This EKG was interpreted by Paxton Ramires MD,ED Provider. [facility]      ED Course     9:51 AM  Results reviewed and are significant for K 5.4 but specimen hemolyzed. Trop <0.04. Receiving her doxycycline IV as she refused to take PO. Plan for repeat trop and BMP  At 10.     9:55 AM  Change of shift. Care of patient signed over to Tran Mariee. Handoff complete.   Procedures

## 2018-03-25 NOTE — Clinical Note
Thank you for allowing us to provide you with medical care today. We realize that you have many choices for your emergency care needs. We thank you for choosing [facility]. Please choose us in the future for any continued health care needs. We hope we addressed all of your medical concerns. We strive to provide excellent quality care in the Emergency Department. Anything less than excellent does not meet our expectations. The exam and treatment you received in the Emergency Department  were for an emergent problem and are not intended as complete care. It is important that you follow up with a doctor, nurse practitioner, or [address] [place] [address] assistant for ongoing care. If your symptoms worsen or you do not improve as expected and you are un able to reach your usual health care provider, you should return to the Emergency Department. We are available 24 hours a day. Take this sheet with you when you go to your follow-up visit. If you have any problem arranging the follow-up visit, co ntact the Emergency Department immediately. Make an appointment your family doctor for follow up of this visit. Return to the ER if you are unable to be seen in a timely manner.

## 2018-03-25 NOTE — DISCHARGE INSTRUCTIONS
Skin Tears: Care Instructions  Your Care Instructions  As we get older, our skin gets drier and more fragile. Sometimes this can cause the outer layers of skin to split and tear open. Skin tears are treated in different ways. In some cases, doctors use pieces of tape called Steri-Strips to pull the skin together and help it heal. Other times, it's best to leave the tear open and cover it with a special wound-care bandage. Skin tears are usually not serious. They usually heal in a few weeks. But how long you take to heal depends on your body and the type of tear you have. Sometimes the torn piece of skin is used to protect the wound while it heals. But that piece of skin does not heal. It may fall off on its own. Or the doctor may remove it. As your tear heals, it's important to keep it clean to help prevent infection. The doctor has checked you carefully, but problems can develop later. If you notice any problems or new symptoms, get medical treatment right away. Follow-up care is a key part of your treatment and safety. Be sure to make and go to all appointments, and call your doctor if you are having problems. It's also a good idea to know your test results and keep a list of the medicines you take. How can you care for yourself at home? · If you have pain, ask your doctor if you can take an over-the-counter pain medicine, such as acetaminophen (Tylenol), ibuprofen (Advil, Motrin), or naproxen (Aleve). Be safe with medicines. Read and follow all instructions on the label. · If you have a bandage, follow your doctor's instructions for changing it. · If you have Steri-Strips, leave them on until they fall off. · Follow your doctor's instructions about bathing. · Gently wash the skin tear with plain water 2 times a day. Do not rub the area. · Let the area air dry. Or you can pat it carefully with a soft towel. When should you call for help?   Call your doctor now or seek immediate medical care if:  ? · You have signs of infection, such as:  ¨ Increased pain, swelling, warmth, or redness around the tear. ¨ Red streaks leading from the tear. ¨ Pus draining from the tear. ¨ A fever. ? · The tear starts to bleed a lot. Small amounts of blood are normal.   ? Watch closely for changes in your health, and be sure to contact your doctor if:  ? · You do not get better as expected. Where can you learn more? Go to http://fritz-darrell.info/. Enter F492 in the search box to learn more about \"Skin Tears: Care Instructions. \"  Current as of: March 20, 2017  Content Version: 11.4  © 6129-2076 [employer]. Care instructions adapted under license by [employer] (which disclaims liability or warranty for this information). If you have questions about a medical condition or this instruction, always ask your healthcare professional. [place] [address] any warranty or liability for your use of this information. Broken Arm: Care Instructions  Your Care Instructions  Fractures can range from a small, hairline crack, to a bone or bones broken into two or more pieces. Your treatment depends on how bad the break is. Your doctor may have put your arm in a splint or cast to allow it to heal or to keep it stable until you see another doctor. It may take weeks or months for your arm to heal. You can help your arm heal with some care at home. You heal best when you take good care of yourself. Eat a variety of healthy foods, and don't smoke. You may have had a sedative to help you relax. You may be unsteady after having sedation. It can take a few hours for the medicine's effects to wear off. Common side effects of sedation include nausea, vomiting, and feeling sleepy or tired. The doctor has checked you carefully, but problems can develop later. If you notice any problems or new symptoms, get medical treatment right away.   Follow-up care is a key part of your treatment and safety. Be sure to make and go to all appointments, and call your doctor if you are having problems. It's also a good idea to know your test results and keep a list of the medicines you take. How can you care for yourself at home? · If the doctor gave you a sedative:  ¨ For 24 hours, don't do anything that requires attention to detail. It takes time for the medicine's effects to completely wear off. ¨ For your safety, do not drive or operate any machinery that could be dangerous. Wait until the medicine wears off and you can think clearly and react easily. · Put ice or a cold pack on your arm for 10 to 20 minutes at a time. Try to do this every 1 to 2 hours for the next 3 days (when you are awake). Put a thin cloth between the ice and your cast or splint. Keep the cast or splint dry. · Follow the cast care instructions your doctor gives you. If you have a splint, do not take it off unless your doctor tells you to. · Be safe with medicines. Take pain medicines exactly as directed. ¨ If the doctor gave you a prescription medicine for pain, take it as prescribed. ¨ If you are not taking a prescription pain medicine, ask your doctor if you can take an over-the-counter medicine. · Prop up your arm on pillows when you sit or lie down in the first few days after the injury. Keep the arm higher than the level of your heart. This will help reduce swelling. · Follow instructions for exercises to keep your arm strong. · Wiggle your fingers and wrist often to reduce swelling and stiffness. When should you call for help? Call 911 anytime you think you may need emergency care. For example, call if:  ? · You are very sleepy and you have trouble waking up. ?Call your doctor now or seek immediate medical care if:  ? · You have new or worse nausea or vomiting. ? · You have new or worse pain. ? · Your hand or fingers are cool or pale or change color. ? · Your cast or splint feels too tight.    ? · You have tingling, weakness, or numbness in your hand or fingers. ? Watch closely for changes in your health, and be sure to contact your doctor if:  ? · You do not get better as expected. ? · You have problems with your cast or splint. Where can you learn more? Go to http://fritz-darrell.info/. Enter R060 in the search box to learn more about \"Broken Arm: Care Instructions. \"  Current as of: March 21, 2017  Content Version: 11.4  © 5607-4863 [employer]. Care instructions adapted under license by [employer] (which disclaims liability or warranty for this information). If you have questions about a medical condition or this instruction, always ask your healthcare professional. [address] any warranty or liability for your use of this information.

## 2018-03-25 NOTE — ED NOTES
Pt c/o of burning at IV site as abx and fluids are infusing. No infiltration noted; flushed line and it was patent. Changed abx rate from 100 ml/hr to 50 ml/hr and IV fluid rate to 100 ml/hr for comfort. Pt continues to say, \"Take it out\" but no longer appears uncomfortable. DIL at bedside.

## 2018-03-25 NOTE — DISCHARGE INSTRUCTIONS
Dizziness: Care Instructions  Your Care Instructions  Dizziness is the feeling of unsteadiness or fuzziness in your head. It is different than having vertigo, which is a feeling that the room is spinning or that you are moving or falling. It is also different from lightheadedness, which is the feeling that you are about to faint. It can be hard to know what causes dizziness. Some people feel dizzy when they have migraine headaches. Sometimes bouts of flu can make you feel dizzy. Some medical conditions, such as heart problems or high blood pressure, can make you feel dizzy. Many medicines can cause dizziness, including medicines for high blood pressure, pain, or anxiety. If a medicine causes your symptoms, your doctor may recommend that you stop or change the medicine. If it is a problem with your heart, you may need medicine to help your heart work better. If there is no clear reason for your symptoms, your doctor may suggest watching and waiting for a while to see if the dizziness goes away on its own. Follow-up care is a key part of your treatment and safety. Be sure to make and go to all appointments, and call your doctor if you are having problems. It's also a good idea to know your test results and keep a list of the medicines you take. How can you care for yourself at home? · If your doctor recommends or prescribes medicine, take it exactly as directed. Call your doctor if you think you are having a problem with your medicine. · Do not drive while you feel dizzy. · Try to prevent falls. Steps you can take include:  ¨ Using nonskid mats, adding grab bars near the tub, and using night-lights. ¨ Clearing your home so that walkways are free of anything you might trip on. ¨ Letting family and friends know that you have been feeling dizzy. This will help them know how to help you. When should you call for help? Call 911 anytime you think you may need emergency care.  For example, call if:  ? · You passed out (lost consciousness). ? · You have dizziness along with symptoms of a heart attack. These may include:  ¨ Chest pain or pressure, or a strange feeling in the chest.  ¨ Sweating. ¨ Shortness of breath. ¨ Nausea or vomiting. ¨ Pain, pressure, or a strange feeling in the back, neck, jaw, or upper belly or in one or both shoulders or arms. ¨ Lightheadedness or sudden weakness. ¨ A fast or irregular heartbeat. ? · You have symptoms of a stroke. These may include:  ¨ Sudden numbness, tingling, weakness, or loss of movement in your face, arm, or leg, especially on only one side of your body. ¨ Sudden vision changes. ¨ Sudden trouble speaking. ¨ Sudden confusion or trouble understanding simple statements. ¨ Sudden problems with walking or balance. ¨ A sudden, severe headache that is different from past headaches. ?Call your doctor now or seek immediate medical care if:  ? · You feel dizzy and have a fever, headache, or ringing in your ears. ? · You have new or increased nausea and vomiting. ? · Your dizziness does not go away or comes back. ? Watch closely for changes in your health, and be sure to contact your doctor if:  ? · You do not get better as expected. Where can you learn more? Go to http://fritz-darrell.info/. Enter K735 in the search box to learn more about \"Dizziness: Care Instructions. \"  Current as of: March 20, 2017  Content Version: 11.4  © 7462-1046 [employer] Care instructions adapted under license by [employer] (which disclaims liability or warranty for this information). If you have questions about a medical condition or this instruction, always ask your healthcare professional. [place] [address] any warranty or liability for your use of this information. We hope that we have addressed all of your medical concerns.  The examination and treatment you received in the Emergency Department were for an emergent problem and were not intended as complete care. It is important that you follow up with your healthcare provider(s) for ongoing care. If your symptoms worsen or do not improve as expected, and you are unable to reach your usual health care provider(s), you should return to the Emergency Department. Today's healthcare is undergoing tremendous change, and patient satisfaction surveys are one of the many tools to assess the quality of medical care. You may receive a survey from the [facility] regarding your experience in the Emergency Department. I hope that your experience has been completely positive, particularly the medical care that I provided. As such, please participate in the survey; anything less than excellent does not meet my expectations or intentions. [facility] and [facility] participate in nationally recognized quality of care measures. If your blood pressure is greater than 120/80, as reported below, we urge that you seek medical care to address the potential of high blood pressure, commonly known as hypertension. Hypertension can be hereditary or can be caused by certain medical conditions, pain, stress, or \"white coat syndrome. \"       Please make an appointment with your health care provider(s) for follow up of your Emergency Department visit. VITALS:   Patient Vitals for the past 8 hrs:   Temp Pulse Resp BP SpO2   03/25/18 0750 - - - 175/47 98 %   03/25/18 0735 - - - 160/58 96 %   03/25/18 0725 - - - 168/73 96 %   03/25/18 0548 98.4 °F (36.9 °C) 62 16 (!) 129/97 99 %          Thank you for allowing us to provide you with medical care today. We realize that you have many choices for your emergency care needs. Please choose us in the future for any continued health care needs. Nelda Cabrera, [address] [place] [address] 20.   Office: [phone]            Recent Results (from the past 24 hour(s))   EKG, 12 LEAD, INITIAL    Collection Time: 03/25/18  6:32 AM   Result Value Ref Range    Ventricular Rate 61 BPM    Atrial Rate 61 BPM    P-R Interval 132 ms    QRS Duration 100 ms    Q-T Interval 434 ms    QTC Calculation (Bezet) 436 ms    Calculated P Axis 71 degrees    Calculated R Axis 24 degrees    Calculated T Axis 124 degrees    Diagnosis       Normal sinus rhythm  T wave abnormality, consider lateral ischemia  Abnormal ECG  When compared with ECG of 08-SEP-2014 13:26,  premature ventricular complexes are no longer present  T wave inversion more evident in Lateral leads     CBC WITH AUTOMATED DIFF    Collection Time: 03/25/18  6:52 AM   Result Value Ref Range    WBC 8.9 3.6 - 11.0 K/uL    RBC 3.90 3.80 - 5.20 M/uL    HGB 11.1 (L) 11.5 - 16.0 g/dL    HCT 34.3 (L) 35.0 - 47.0 %    MCV 87.9 80.0 - 99.0 FL    MCH 28.5 26.0 - 34.0 PG    MCHC 32.4 30.0 - 36.5 g/dL    RDW 13.9 11.5 - 14.5 %    PLATELET 673 010 - 348 K/uL    MPV 10.6 8.9 - 12.9 FL    NRBC 0.0 0  WBC    ABSOLUTE NRBC 0.00 0.00 - 0.01 K/uL    NEUTROPHILS 78 (H) 32 - 75 %    LYMPHOCYTES 12 12 - 49 %    MONOCYTES 7 5 - 13 %    EOSINOPHILS 2 0 - 7 %    BASOPHILS 1 0 - 1 %    IMMATURE GRANULOCYTES 0 0.0 - 0.5 %    ABS. NEUTROPHILS 6.9 1.8 - 8.0 K/UL    ABS. LYMPHOCYTES 1.1 0.8 - 3.5 K/UL    ABS. MONOCYTES 0.6 0.0 - 1.0 K/UL    ABS. EOSINOPHILS 0.2 0.0 - 0.4 K/UL    ABS. BASOPHILS 0.1 0.0 - 0.1 K/UL    ABS. IMM.  GRANS. 0.0 0.00 - 0.04 K/UL    DF AUTOMATED     METABOLIC PANEL, COMPREHENSIVE    Collection Time: 03/25/18  6:52 AM   Result Value Ref Range    Sodium 138 136 - 145 mmol/L    Potassium 5.4 (H) 3.5 - 5.1 mmol/L    Chloride 104 97 - 108 mmol/L    CO2 25 21 - 32 mmol/L    Anion gap 9 5 - 15 mmol/L    Glucose 144 (H) 65 - 100 mg/dL    BUN 17 6 - 20 MG/DL    Creatinine 0.98 0.55 - 1.02 MG/DL    BUN/Creatinine ratio 17 12 - 20      GFR est AA >60 >60 ml/min/1.73m2    GFR est non-AA 52 (L) >60 ml/min/1.73m2    Calcium 9.0 8.5 - 10.1 MG/DL    Bilirubin, total 0.5 0.2 - 1.0 MG/DL    ALT (SGPT) 24 12 - 78 U/L    AST (SGOT) 46 (H) 15 - 37 U/L    Alk. phosphatase 81 45 - 117 U/L    Protein, total 7.5 6.4 - 8.2 g/dL    Albumin 3.2 (L) 3.5 - 5.0 g/dL    Globulin 4.3 (H) 2.0 - 4.0 g/dL    A-G Ratio 0.7 (L) 1.1 - 2.2     TROPONIN I    Collection Time: 03/25/18  6:52 AM   Result Value Ref Range    Troponin-I, Qt. <0.04 <0.05 ng/mL       Xr Elbow Lt Min 3 V    Result Date: 3/24/2018  EXAM:  XR ELBOW LT MIN 3 V INDICATION:   elbow pain fall. COMPARISON: None. FINDINGS: Three views of the left elbow demonstrate a moderately large elbow joint effusion with an oblique fracture of the olecranon process of the proximal ulna. There is no displacement of fracture fragments, but the fracture shows intra-articular extension. IMPRESSION: Acute nondisplaced left proximal ulnar olecranon fracture. .     Ct Head Wo Cont    Result Date: 3/25/2018  Indication:  AMS Comparison: CT 10/8/2017 Findings: 5 mm axial images were obtained from the skull base through the vertex. CT dose reduction was achieved through the use of a standardized protocol tailored for this examination and automatic exposure control for dose modulation. The ventricles and cortical sulci are prominent, compatible with age related volume loss. There is no evidence of intracranial hemorrhage, mass, mass effect, or acute infarct. There is a chronic right MCA infarct. There is periventricular white matter disease. No extra-axial fluid collections are seen. There is right maxillary sinus disease. The orbital structures are unremarkable. No osseous abnormalities are seen. Impression: 1. No evidence of acute infarct or intracranial hemorrhage. 2. Periventricular white matter disease is likely secondary to chronic small vessel ischemic changes. 3. Chronic right MCA distribution infarction.

## 2018-03-25 NOTE — ED PROVIDER NOTES
HPI Comments: 80 y.o. female with past medical history significant for diverticulitis, HTN, anxiety who presents via private vehicle with chief complaint of evaluation post ground level fall. Pt c/o L elbow pain. Pt has an abrasion to L elbow that initially bled, but has since stopped. Pt did not take anything for the pain PTA. There are no other acute medical concerns at this time. Social hx: denies EtOH consumption, denies tobacco use     PCP: Raymond Hobson MD    Full history, physical exam, and ROS unable to be obtained due to:  dementia. Note written by Iain Harp, as dictated by Meseret Lucas MD 10:55 PM      The history is provided by the nursing home and a relative. No  was used. Past Medical History:   Diagnosis Date    Diverticulitis     Gastrointestinal disorder diverticulitis    Hypertension     Psychiatric disorder     anxiety       Past Surgical History:   Procedure Laterality Date    ABDOMEN SURGERY PROC UNLISTED  unable to recall    colon resection         No family history on file. Social History     Social History    Marital status:      Spouse name: N/A    Number of children: N/A    Years of education: N/A     Occupational History    Not on file. Social History Main Topics    Smoking status: Never Smoker    Smokeless tobacco: Not on file    Alcohol use No    Drug use: No    Sexual activity: Not Currently     Other Topics Concern    Not on file     Social History Narrative         ALLERGIES: Dilaudid [hydromorphone (bulk)]; Keflex [cephalexin]; and Morphine    Review of Systems   Unable to perform ROS: Dementia       Vitals:    03/24/18 2138   BP: 192/80   Pulse: 65   Resp: 19   Temp: 97.7 °F (36.5 °C)   SpO2: 95%   Weight: 54.4 kg (120 lb)            Physical Exam   Constitutional: She appears well-developed and well-nourished. No distress. HENT:   Head: Normocephalic and atraumatic.    Mouth/Throat: Oropharynx is clear and moist.   Eyes: Conjunctivae and EOM are normal.   Neck: Normal range of motion and phonation normal.   Cardiovascular: Normal rate and intact distal pulses. Pulmonary/Chest: Effort normal. No respiratory distress. Abdominal: She exhibits no distension. Musculoskeletal: Normal range of motion. Left elbow: She exhibits swelling and effusion. Tenderness found. Olecranon process tenderness noted. Neurological: She is alert. She is not disoriented. She exhibits normal muscle tone. Skin: Skin is warm and dry. Laceration (skin tear, left elbow, bleeding controlled with steristrips applied at [facility]) noted. Nursing note and vitals reviewed. MDM      ED Course     Discussed XR findings of non displaced olecranon process fracture with overlying skin tear. Recommend dressing with xeroform prior to posterior splint. Follow-up with hands. And oral ABX x 5 days. Patient is allergic to keflex will treat with doxycycline. Patient to follow-up with Dr. Elidia Guerrier. Discussed with  who will arrange appointment.      Procedures

## 2018-03-25 NOTE — PROGRESS NOTES
9:55 AM  Change of shift. Care of patient taken over from Dr Morocho Inch; H&P reviewed, handoff complete. Awaiting labs/imaging/consultant. Pt at CT;     10:34 AM  Discussed plans w/ Pt/ family; they are currently refusing UA; agree w/ CT head/ trop/ K; awaiting results;     10:42 AM Family refusing further testing/ will discharge home; they will complete doxy IV/ see orthopaedist Monday as discussed;   Luther Schreiber  results have been reviewed with her. She has been counseled regarding her diagnosis. She verbally conveys understanding and agreement of the signs, symptoms, diagnosis, treatment and prognosis and additionally agrees to Call/ Arrange follow up as recommended with Dr. Essie Olsen MD in 24 - 48 hours. She also agrees with the care-plan and conveys that all of her questions have been answered. I have also put together some discharge instructions for her that include: 1) educational information regarding their diagnosis, 2) how to care for their diagnosis at home, as well a 3) list of reasons why they would want to return to the ED prior to their follow-up appointment, should their condition change or for concerns.

## 2018-03-25 NOTE — ED NOTES
Bedside and Verbal shift change report given to Lesli Zavaleta RN (oncoming nurse) by Bossman Mixon RN (offgoing nurse). Report included the following information SBAR, ED Summary, MAR and Recent Results.

## 2018-03-25 NOTE — ED TRIAGE NOTES
Patient arrives via EMS from [facility] for complaint of general malaise. Staff at [facility] claimed patient did not appear well.  Staff did not call on call physician at [facility]

## 2018-03-25 NOTE — ED TRIAGE NOTES
Pt. Had GLF and noted skin tear to left elbow, with some pain noted. Pt normally walks without walker and steady gait.

## 2018-03-26 LAB
ATRIAL RATE: 61 BPM
CALCULATED P AXIS, ECG09: 71 DEGREES
CALCULATED R AXIS, ECG10: 24 DEGREES
CALCULATED T AXIS, ECG11: 124 DEGREES
DIAGNOSIS, 93000: NORMAL
P-R INTERVAL, ECG05: 132 MS
Q-T INTERVAL, ECG07: 434 MS
QRS DURATION, ECG06: 100 MS
QTC CALCULATION (BEZET), ECG08: 436 MS
VENTRICULAR RATE, ECG03: 61 BPM

## 2018-06-18 ENCOUNTER — APPOINTMENT (OUTPATIENT)
Dept: GENERAL RADIOLOGY | Age: 83
End: 2018-06-18
Attending: EMERGENCY MEDICINE
Payer: MEDICARE

## 2018-06-18 ENCOUNTER — APPOINTMENT (OUTPATIENT)
Dept: CT IMAGING | Age: 83
End: 2018-06-18
Attending: EMERGENCY MEDICINE
Payer: MEDICARE

## 2018-06-18 ENCOUNTER — HOSPITAL ENCOUNTER (EMERGENCY)
Age: 83
Discharge: HOME | End: 2018-06-18
Attending: EMERGENCY MEDICINE | Admitting: EMERGENCY MEDICINE
Payer: MEDICARE

## 2018-06-18 VITALS
WEIGHT: 120 LBS | TEMPERATURE: 98.4 F | HEART RATE: 77 BPM | DIASTOLIC BLOOD PRESSURE: 52 MMHG | BODY MASS INDEX: 24.24 KG/M2 | OXYGEN SATURATION: 98 % | RESPIRATION RATE: 15 BRPM | SYSTOLIC BLOOD PRESSURE: 151 MMHG

## 2018-06-18 DIAGNOSIS — S61.512A WRIST LACERATION, LEFT, INITIAL ENCOUNTER: ICD-10-CM

## 2018-06-18 DIAGNOSIS — S09.90XA INJURY OF HEAD, INITIAL ENCOUNTER: Primary | ICD-10-CM

## 2018-06-18 DIAGNOSIS — S61.412A LACERATION OF LEFT HAND, FOREIGN BODY PRESENCE UNSPECIFIED, INITIAL ENCOUNTER: ICD-10-CM

## 2018-06-18 PROCEDURE — 70450 CT HEAD/BRAIN W/O DYE: CPT

## 2018-06-18 PROCEDURE — 70486 CT MAXILLOFACIAL W/O DYE: CPT

## 2018-06-18 PROCEDURE — 75810000293 HC SIMP/SUPERF WND  RPR

## 2018-06-18 PROCEDURE — 73110 X-RAY EXAM OF WRIST: CPT

## 2018-06-18 PROCEDURE — 74011250637 HC RX REV CODE- 250/637: Performed by: EMERGENCY MEDICINE

## 2018-06-18 PROCEDURE — 99284 EMERGENCY DEPT VISIT MOD MDM: CPT

## 2018-06-18 PROCEDURE — 77030039266 HC ADH SKN EXOFIN S2SG -A

## 2018-06-18 PROCEDURE — 72125 CT NECK SPINE W/O DYE: CPT

## 2018-06-18 RX ORDER — AMOXICILLIN AND CLAVULANATE POTASSIUM 875; 125 MG/1; MG/1
1 TABLET, FILM COATED ORAL 2 TIMES DAILY
Qty: 10 TAB | Refills: 0 | Status: SHIPPED | OUTPATIENT
Start: 2018-06-18 | End: 2018-09-05

## 2018-06-18 RX ORDER — ACETAMINOPHEN 325 MG/1
650 TABLET ORAL
Status: COMPLETED | OUTPATIENT
Start: 2018-06-18 | End: 2018-06-18

## 2018-06-18 RX ORDER — ACETAMINOPHEN 325 MG/1
TABLET ORAL
Status: DISCONTINUED
Start: 2018-06-18 | End: 2018-06-18 | Stop reason: HOSPADM

## 2018-06-18 RX ADMIN — ACETAMINOPHEN 650 MG: 325 TABLET ORAL at 18:23

## 2018-06-18 NOTE — DISCHARGE INSTRUCTIONS
We hope that we have addressed all of your medical concerns. The examination and treatment you received in the Emergency Department were for an emergent problem and were not intended as complete care. It is important that you follow up with your healthcare provider(s) for ongoing care. If your symptoms worsen or do not improve as expected, and you are unable to reach your usual health care provider(s), you should return to the Emergency Department. Today's healthcare is undergoing tremendous change, and patient satisfaction surveys are one of the many tools to assess the quality of medical care. You may receive a survey from the [employer] regarding your experience in the Emergency Department. I hope that your experience has been completely positive, particularly the medical care that I provided. As such, please participate in the survey; anything less than excellent does not meet my expectations or intentions. [facility] and [facility] participate in nationally recognized quality of care measures. If your blood pressure is greater than 120/80, as reported below, we urge that you seek medical care to address the potential of high blood pressure, commonly known as hypertension. Hypertension can be hereditary or can be caused by certain medical conditions, pain, stress, or \"white coat syndrome. \"       Please make an appointment with your health care provider(s) for follow up of your Emergency Department visit. VITALS:   Patient Vitals for the past 8 hrs:   Temp Pulse Resp BP SpO2   06/18/18 1821 - - - 151/52 98 %   06/18/18 1746 - - - 155/80 95 %   06/18/18 1729 98.4 °F (36.9 °C) 77 15 159/56 97 %   06/18/18 1728 - - - - (!) 88 %   06/18/18 1727 - - - 159/56 -          Thank you for allowing us to provide you with medical care today. We realize that you have many choices for your emergency care needs.   Please choose us in the future for any continued health care needs. MD STACY Braxton [facility] 70: [phone]            No results found for this or any previous visit (from the past 24 hour(s)). Xr Wrist Lt Ap/lat/obl Min 3v    Result Date: 6/18/2018  EXAM: XR WRIST LT AP/LAT/OBL MIN 3V INDICATION:  trauma. COMPARISON: None. FINDINGS: Three  views of the left wrist demonstrate no acute fracture or other acute osseous or articular abnormality. Chronic healed distal radius fracture deformity is noted. Diffuse osteopenia noted. The soft tissues are within normal limits. IMPRESSION: No acute findings. Osteopenia and chronic healed distal radius fracture deformity. Ct Head Wo Cont    Result Date: 6/18/2018  EXAM:  CT HEAD WO CONT INDICATION:   head injury COMPARISON: March 25, 2018. CONTRAST:  None. TECHNIQUE: Unenhanced CT of the head was performed using 5 mm images. Brain and bone windows were generated. CT dose reduction was achieved through use of a standardized protocol tailored for this examination and automatic exposure control for dose modulation. FINDINGS: The ventricles and sulci are normal in size, shape and configuration and midline. There is periventricular hypoattenuation. There is old ischemia in the right parietal lobe. There is no intracranial hemorrhage, extra-axial collection, mass, mass effect or midline shift. The basilar cisterns are open. No acute infarct is identified. The bone windows demonstrate no abnormalities. There is mucosal thickening of the right maxillary sinus. IMPRESSION: No change. Ct Maxillofacial Wo Cont    Result Date: 6/18/2018  EXAM:  CT MAXILLOFACIAL WO CONT INDICATION:   trauma swelling to nose COMPARISON:  None. CONTRAST:   None. TECHNIQUE:  Multislice helical CT of the facial bones was performed in the axial plane without intravenous contrast administration. Coronal and sagittal reformations were generated.   CT dose reduction was achieved through use of a standardized protocol tailored for this examination and automatic exposure control for dose modulation. FINDINGS: There is no facial fracture or other osseous abnormality. There is mucosal thickening of the axillary sinuses. There is bilateral flor bullosa. There is a leftward nasal septum deviation. There are dental implants. The globes, optic nerves and extraocular muscles are normal. No abnormalities are identified within the visualized portions of the brain or nasopharynx. IMPRESSION: No acute traumatic injury. Sinus disease. Ct Spine Cerv Wo Cont    Result Date: 6/18/2018  EXAM:  CT CERVICAL SPINE WITHOUT CONTRAST INDICATION:   trauma fall. COMPARISON: None. CONTRAST:  None. TECHNIQUE: Multislice helical CT of the cervical spine was performed without intravenous contrast administration. Sagittal and coronal reconstructions were generated. CT dose reduction was achieved through use of a standardized protocol tailored for this examination and automatic exposure control for dose modulation. FINDINGS: The alignment is within normal limits. There is no fracture or subluxation. The odontoid process is intact. The craniocervical junction is within normal limits. The prevertebral soft tissues are within normal limits. There is anterior osteophyte formation. C2-C3:  There is no spinal canal or neural foraminal stenosis. C3-C4:  There is no spinal canal or neural foraminal stenosis. C4-C5:  There is no spinal canal or neural foraminal stenosis. C5-C6:  There is no spinal canal or neural foraminal stenosis. C6-C7:  There is no spinal canal or neural foraminal stenosis. C7-T1:  There is no spinal canal or neural foraminal stenosis. IMPRESSION: No evidence of acute traumatic injury to the cervical spine. Learning About a Closed Head Injury  What is a closed head injury? A closed head injury happens when your head gets hit hard.  The strong force of the blow causes your brain to shake in your skull. This movement can cause the brain to bruise, swell, or tear. Sometimes nerves or blood vessels also get damaged. This can cause bleeding in or around the brain. A concussion is a type of closed head injury. What are the symptoms? If you have a mild concussion, you may have a mild headache or feel \"not quite right. \" These symptoms are common. They usually go away over a few days to 4 weeks. But sometimes after a concussion, you feel like you can't function as well as before the injury. And you have new symptoms. This is called postconcussive syndrome. You may:  · Find it harder to solve problems, think, concentrate, or remember. · Have headaches. · Have changes in your sleep patterns, such as not being able to sleep or sleeping all the time. · Have changes in your personality. · Not be interested in your usual activities. · Feel angry or anxious without a clear reason. · Lose your sense of taste or smell. · Be dizzy, lightheaded, or unsteady. It may be hard to stand or walk. How is a closed head injury treated? Any person who may have a concussion needs to see a doctor. Some people have to stay in the hospital to be watched. Others can go home safely. If you go home, follow your doctor's instructions. He or she will tell you if you need someone to watch you closely for the next 24 hours or longer. Rest is the best treatment. Get plenty of sleep at night. And try to rest during the day. · Avoid activities that are physically or mentally demanding. These include housework, exercise, and schoolwork. And don't play video games, send text messages, or use the computer. You may need to change your school or work schedule to be able to avoid these activities. · Ask your doctor when it's okay to drive, ride a bike, or operate machinery. · Take an over-the-counter pain medicine, such as acetaminophen (Tylenol), ibuprofen (Advil, Motrin), or naproxen (Aleve).  Be safe with medicines. Read and follow all instructions on the label. · Check with your doctor before you use any other medicines for pain. · Do not drink alcohol or use illegal drugs. They can slow recovery. They can also increase your risk of getting a second head injury. Follow-up care is a key part of your treatment and safety. Be sure to make and go to all appointments, and call your doctor if you are having problems. It's also a good idea to know your test results and keep a list of the medicines you take. Where can you learn more? Go to http://fritz-darrell.info/. Enter E235 in the search box to learn more about \"Learning About a Closed Head Injury. \"  Current as of: October 14, 2016  Content Version: 11.4  © 6219-5143 [employer]. Care instructions adapted under license by [employer] (which disclaims liability or warranty for this information). If you have questions about a medical condition or this instruction, always ask your healthcare professional. [address] any warranty or liability for your use of this information. Cuts Closed With Adhesives: Care Instructions  Your Care Instructions  A cut can happen anywhere on your body. The doctor used an adhesive to close the cut. When the adhesive dries, it forms a film that holds the edges of the cut together. Skin adhesives are sometimes called liquid stitches. If the cut went deep and through the skin, the doctor may have put in a layer of stitches below the adhesive. The deeper layer of stitches brings the deep part of the cut together. These stitches will dissolve and don't need to be removed. You don't see the stitches, only the adhesive. You may have a bandage. The doctor has checked you carefully, but problems can develop later. If you notice any problems or new symptoms, get medical treatment right away. Follow-up care is a key part of your treatment and safety.  Be sure to make and go to all appointments, and call your doctor if you are having problems. It's also a good idea to know your test results and keep a list of the medicines you take. How can you care for yourself at home? · Keep the cut dry for the first 24 to 48 hours. After this, you can shower if your doctor okays it. Pat the cut dry. · Don't soak the cut, such as in a bathtub. Your doctor will tell you when it's safe to get the cut wet. · If your doctor told you how to care for your cut, follow your doctor's instructions. If you did not get instructions, follow this general advice:  ¨ Do not put any kind of ointment, cream, or lotion over the area. This can make the adhesive fall off too soon. ¨ After the first 24 to 48 hours, wash around the cut with clean water 2 times a day. Do not use hydrogen peroxide or alcohol, which can slow healing. ¨ If the doctor told you to use a bandage, put on a new bandage after cleaning the cut or if the bandage gets wet or dirty. · Prop up the sore area on a pillow anytime you sit or lie down during the next 3 days. Try to keep it above the level of your heart. This will help reduce swelling. · Leave the skin adhesive on your skin until it falls off on its own. This may take 5 to 10 days. · Do not scratch, rub, or pick at the adhesive. · Do not put the sticky part of a bandage directly on the adhesive. · Avoid any activity that could cause your cut to reopen. · Be safe with medicines. Read and follow all instructions on the label. ¨ If the doctor gave you a prescription medicine for pain, take it as prescribed. ¨ If you are not taking a prescription pain medicine, ask your doctor if you can take an over-the-counter medicine. When should you call for help? Call your doctor now or seek immediate medical care if:  ? · You have new pain, or your pain gets worse. ? · The skin near the cut is cold or pale or changes color.    ? · You have tingling, weakness, or numbness near the cut.   ? · The cut starts to bleed. ? · You have trouble moving the area near the cut.   ? · You have symptoms of infection, such as:  ¨ Increased pain, swelling, warmth, or redness around the cut. ¨ Red streaks leading from the cut. ¨ Pus draining from the cut. ¨ A fever. ? Watch closely for changes in your health, and be sure to contact your doctor if:  ? · The cut reopens. ? · You do not get better as expected. Where can you learn more? Go to http://fritz-darrell.info/. Enter P174 in the search box to learn more about \"Cuts Closed With Adhesives: Care Instructions. \"  Current as of: March 20, 2017  Content Version: 11.4  © 9641-9606 [employer]. Care instructions adapted under license by [employer] (which disclaims liability or warranty for this information). If you have questions about a medical condition or this instruction, always ask your healthcare professional. [place] [address] any warranty or liability for your use of this information.
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
## 2018-06-18 NOTE — ED NOTES
The wounds to the left hand, wrist and the nose were cleaned with saf-clense. Dermabond at the bedside.

## 2018-06-18 NOTE — ED TRIAGE NOTES
Patient with fall at the nursing home today, skin tear to left hand and bruising/abrasion to nose. Patient is confused to baseline, hx of alzheimer's.

## 2018-06-19 NOTE — ED NOTES
at bedside requesting to have patient discharged \" I'm ready to take her out of here\". Relayed needed to dress wound to left wrist he stated \" I already put some guaze and tape on it\".  Observed patient dressing, will not change due to husbands request, will return triple abx ointment, this relayed to MD.

## 2018-09-05 ENCOUNTER — APPOINTMENT (OUTPATIENT)
Dept: GENERAL RADIOLOGY | Age: 83
End: 2018-09-05
Attending: EMERGENCY MEDICINE
Payer: MEDICARE

## 2018-09-05 ENCOUNTER — HOSPITAL ENCOUNTER (EMERGENCY)
Age: 83
Discharge: HOME | End: 2018-09-06
Attending: EMERGENCY MEDICINE
Payer: MEDICARE

## 2018-09-05 DIAGNOSIS — S82.044A CLOSED NONDISPLACED COMMINUTED FRACTURE OF RIGHT PATELLA, INITIAL ENCOUNTER: Primary | ICD-10-CM
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
## 2018-09-05 PROCEDURE — 99285 EMERGENCY DEPT VISIT HI MDM: CPT

## 2018-09-05 PROCEDURE — 73564 X-RAY EXAM KNEE 4 OR MORE: CPT

## 2018-09-05 NOTE — Clinical Note
- Rest, ice, elevate your knee. - Knee immobilizer - especially when ambulating. 
- Acetaminophen for pain. Oxycodone for pain not relieved by Acetaminophen. - Increase Miralax to twice a day while taking Oxycodone. 
- Return to ED for any concerns.

## 2018-09-06 VITALS
HEART RATE: 66 BPM | SYSTOLIC BLOOD PRESSURE: 166 MMHG | OXYGEN SATURATION: 97 % | DIASTOLIC BLOOD PRESSURE: 62 MMHG | TEMPERATURE: 98.3 F | RESPIRATION RATE: 16 BRPM

## 2018-09-06 PROCEDURE — L1830 KO IMMOB CANVAS LONG PRE OTS: HCPCS

## 2018-09-06 PROCEDURE — 74011250637 HC RX REV CODE- 250/637: Performed by: EMERGENCY MEDICINE

## 2018-09-06 RX ORDER — OXYCODONE HYDROCHLORIDE 5 MG/1
2.5-5 TABLET ORAL
Qty: 20 TAB | Refills: 0 | Status: SHIPPED | OUTPATIENT
Start: 2018-09-06 | End: 2018-09-11

## 2018-09-06 RX ORDER — OXYCODONE HYDROCHLORIDE 5 MG/1
2.5 TABLET ORAL
Status: COMPLETED | OUTPATIENT
Start: 2018-09-06 | End: 2018-09-06

## 2018-09-06 RX ORDER — ACETAMINOPHEN 325 MG/1
650 TABLET ORAL
Status: COMPLETED | OUTPATIENT
Start: 2018-09-06 | End: 2018-09-06

## 2018-09-06 RX ORDER — OXYCODONE HYDROCHLORIDE 5 MG/1
2.5-5 TABLET ORAL
Qty: 20 TAB | Refills: 0 | Status: SHIPPED | OUTPATIENT
Start: 2018-09-06 | End: 2018-09-06

## 2018-09-06 RX ORDER — NALOXONE HYDROCHLORIDE 4 MG/.1ML
SPRAY NASAL
Qty: 2 EACH | Refills: 2 | Status: SHIPPED | OUTPATIENT
Start: 2018-09-06 | End: 2020-02-03

## 2018-09-06 RX ADMIN — OXYCODONE HYDROCHLORIDE 2.5 MG: 5 TABLET ORAL at 00:28

## 2018-09-06 RX ADMIN — ACETAMINOPHEN 650 MG: 325 TABLET ORAL at 00:27

## 2018-09-06 NOTE — ED TRIAGE NOTES
Pt fell while dancing at the [facility] around 1700. R knee is swollen with scattered ecchymosis; fx confirmed via xray done at the [facility]. Pt baseline of dementia, A&O x 1.

## 2018-09-06 NOTE — ED NOTES
Discharge Instructions Reviewed with patient son . Discharge instructions given to patient per provider. Patient son able to return verbalize discharge instructions. Paper copy of discharge instructions given. RX given to patient son per provider. Patient condition stable, Respiratory status WNL, Neurostatus intact. Patient wheeled out of er, to nursing home  with son.

## 2018-09-06 NOTE — ED PROVIDER NOTES
HPI Comments: 80 y.o. female with past medical history significant for HTN and diverticulitis who presents via EMS from home with chief complaint of knee pain. Patient arrives c/o right knee pain after sustaining a fall while dancing at the [facility] at approx. 1700 tonight (9/5/18). Patient underwent x-ray imaging at the [facility] which confirmed a fracture. Patient is A&O x1. There are no other acute medical concerns at this time. Social hx: Never tobacco smoker; Denies EtOH use; Denies illicit drug use PCP: Denisse Frederick MD 
 
Note written by Iain Reddy, as dictated by Dmitri Houston MD 12:21 AM 
 
The history is provided by the patient. No  was used. Past Medical History:  
Diagnosis Date  Diverticulitis  Gastrointestinal disorder diverticulitis  Hypertension  Psychiatric disorder   
 anxiety Past Surgical History:  
Procedure Laterality Date  ABDOMEN SURGERY PROC UNLISTED  unable to recall  
 colon resection No family history on file. Social History Social History  Marital status:  Spouse name: N/A  
 Number of children: N/A  
 Years of education: N/A Occupational History  Not on file. Social History Main Topics  Smoking status: Never Smoker  Smokeless tobacco: Never Used  Alcohol use No  
 Drug use: No  
 Sexual activity: Not Currently Other Topics Concern  Not on file Social History Narrative ALLERGIES: Dilaudid [hydromorphone (bulk)]; Keflex [cephalexin]; and Morphine Review of Systems Musculoskeletal:  
     +right knee pain All other systems reviewed and are negative. Vitals:  
 09/05/18 2259 09/05/18 2315 BP: 193/61 162/66 Pulse: 66 Resp: 16 Temp: 98.3 °F (36.8 °C) SpO2: 98% 98% Physical Exam  
Constitutional: She appears well-developed and well-nourished. HENT:  
Head: Normocephalic and atraumatic. Mouth/Throat: Oropharynx is clear and moist.  
Eyes: Conjunctivae are normal.  
Neck: Normal range of motion. Neck supple. Cardiovascular: Normal rate, regular rhythm and normal heart sounds. Pulmonary/Chest: Effort normal and breath sounds normal.  
Abdominal: Soft. Bowel sounds are normal. There is no tenderness. Musculoskeletal: Normal range of motion. She exhibits no edema or tenderness. Right knee markedly swollen and ecchymotic. Pain with ROM knee. No pain with ROM of R hip. Neurological: She is alert. Skin: Skin is warm and dry. Psychiatric: She has a normal mood and affect. Her behavior is normal.  
Nursing note and vitals reviewed. Note written by Iain Esparza, as dictated by Chauncey Curiel MD 12:21 AM 
 
[facility] 
 
 
ED Course Procedures 12:11 AM 
Dr. Christina Muniz spoke with Dr. Sophia Kenny, ortho consult - recommends knee immobilizer in the ED and to f/u with Dr. Charles Daily, ortho surg. A/P: 
1. R patella fracture - knee immobilizer. WBAT. F/U with ortho. Acetaminophen for pain. Low dose oxycodone for continued pain. 2. Fall Patient's results have been reviewed with them. Patient and/or family have verbally conveyed their understanding and agreement of the patient's signs, symptoms, diagnosis, treatment and prognosis and additionally agree to follow up as recommended or return to the Emergency Room should their condition change prior to follow-up. Discharge instructions have also been provided to the patient with some educational information regarding their diagnosis as well a list of reasons why they would want to return to the ER prior to their follow-up appointment should their condition change.

## 2018-09-06 NOTE — ED NOTES
Due to patient's memory issues, patient unable to be instructed/educated on walker use.  made aware.

## 2018-09-06 NOTE — DISCHARGE INSTRUCTIONS
Broken Kneecap: Care Instructions  Your Care Instructions    The kneecap (patella) is a bone that protects the front of your knee joint. It takes the brunt of any blows to your knee, such as a fall onto your knee or your knee hitting the dashboard. Symptoms of a broken kneecap (fracture) are swelling and pain, especially when moving the knee back and forth. You may not need surgery if the fracture has not moved your kneecap out of position. But sometimes surgery is needed to move the pieces of the kneecap back where they belong and to repair damage. Whether or not you have surgery, you probably will wear a cast or immobilizer on your leg for several weeks while the kneecap heals. Wear and take care of the cast or immobilizer exactly as your doctor advises. You may need to ask for help with daily tasks. You heal best when you take good care of yourself. Eat a variety of healthy foods, and don't smoke. Follow-up care is a key part of your treatment and safety. Be sure to make and go to all appointments, and call your doctor if you are having problems. It's also a good idea to know your test results and keep a list of the medicines you take. How can you care for yourself at home? · Follow your doctor's instructions for taking care of your cast or immobilizer, which is a protective brace that keeps your knee from moving. Do not remove it until your doctor says you can. · Prop up the sore leg on a pillow when you ice it or anytime you sit or lie down during the next 3 days. Try to keep it above the level of your heart. This will help reduce swelling. · Put ice or a cold pack on your knee for 10 to 20 minutes at a time. Try to do this every 1 to 2 hours for the next 3 days (when you are awake) or until the swelling goes down. Put a thin cloth between the ice and your skin. Be careful not to get the cast or immobilizer wet.   · Do not use oils or lotions near your cast. If the skin becomes red or sore around the edge of the cast, you may pad the edges with a soft material, such as moleskin, or use tape to cover the edges. · Be safe with medicines. Read and follow all instructions on the label. ¨ If the doctor gave you a prescription medicine for pain, take it as prescribed. ¨ If you are not taking a prescription pain medicine, ask your doctor if you can take an over-the-counter medicine. When should you call for help? Call 911 anytime you think you may need emergency care. For example, call if:    · You have chest pain, are short of breath, or you cough up blood.     · You are very sleepy and you have trouble waking up.    Call your doctor now or seek immediate medical care if:    · You have new or worse nausea or vomiting.     · You have new or worse pain.     · Your foot is cool or pale or changes color.     · You have tingling, weakness, or numbness in your toes.     · Your cast or splint feels too tight.     · You have signs of a blood clot in your leg (called a deep vein thrombosis), such as:  ¨ Pain in your calf, back of the knee, thigh, or groin. ¨ Redness or swelling in your leg.    Watch closely for changes in your health, and be sure to contact your doctor if:    · You have a problem with your splint or cast.     · You do not get better as expected. Where can you learn more? Go to http://fritz-darrell.info/. Enter H267 in the search box to learn more about \"Broken Kneecap: Care Instructions. \"  Current as of: November 29, 2017  Content Version: 11.7  © 5353-8771 [employer]. Care instructions adapted under license by [employer] (which disclaims liability or warranty for this information). If you have questions about a medical condition or this instruction, always ask your healthcare professional. [address] any warranty or liability for your use of this information.          Preventing Falls: Care Instructions  Your Care Instructions    Getting around your home safely can be a challenge if you have injuries or health problems that make it easy for you to fall. Loose rugs and furniture in walkways are among the dangers for many older people who have problems walking or who have poor eyesight. People who have conditions such as arthritis, osteoporosis, or dementia also have to be careful not to fall. You can make your home safer with a few simple measures. Follow-up care is a key part of your treatment and safety. Be sure to make and go to all appointments, and call your doctor if you are having problems. It's also a good idea to know your test results and keep a list of the medicines you take. How can you care for yourself at home? Taking care of yourself  · You may get dizzy if you do not drink enough water. To prevent dehydration, drink plenty of fluids, enough so that your urine is light yellow or clear like water. Choose water and other caffeine-free clear liquids. If you have kidney, heart, or liver disease and have to limit fluids, talk with your doctor before you increase the amount of fluids you drink. · Exercise regularly to improve your strength, muscle tone, and balance. Walk if you can. Swimming may be a good choice if you cannot walk easily. · Have your vision and hearing checked each year or any time you notice a change. If you have trouble seeing and hearing, you might not be able to avoid objects and could lose your balance. · Know the side effects of the medicines you take. Ask your doctor or pharmacist whether the medicines you take can affect your balance. Sleeping pills or sedatives can affect your balance. · Limit the amount of alcohol you drink. Alcohol can impair your balance and other senses. · Ask your doctor whether calluses or corns on your feet need to be removed. If you wear loose-fitting shoes because of calluses or corns, you can lose your balance and fall.   · Talk to your doctor if you have numbness in your feet. Preventing falls at home  · Remove raised doorway thresholds, throw rugs, and clutter. Repair loose carpet or raised areas in the floor. · Move furniture and electrical cords to keep them out of walking paths. · Use nonskid floor wax, and wipe up spills right away, especially on ceramic tile floors. · If you use a walker or cane, put rubber tips on it. If you use crutches, clean the bottoms of them regularly with an abrasive pad, such as steel wool. · Keep your house well lit, especially [address], and outside walkways. Use night-lights in areas such as hallways and bathrooms. Add extra light switches or use remote switches (such as switches that go on or off when you clap your hands) to make it easier to turn lights on if you have to get up during the night. · Install sturdy handrails on stairways. · Move items in your cabinets so that the things you use a lot are on the lower shelves (about waist level). · Keep a cordless phone and a flashlight with new batteries by your bed. If possible, put a phone in each of the main rooms of your house, or carry a cell phone in case you fall and cannot reach a phone. Or, you can wear a device around your neck or wrist. You push a button that sends a signal for help. · Wear low-heeled shoes that fit well and give your feet good support. Use footwear with nonskid soles. Check the heels and soles of your shoes for wear. Repair or replace worn heels or soles. · Do not wear socks without shoes on wood floors. · Walk on the grass when the sidewalks are slippery. If you live in an area that gets snow and ice in the winter, sprinkle salt on slippery steps and sidewalks. Preventing falls in the bath  · Install grab bars and nonskid mats inside and outside your shower or tub and near the toilet and sinks. · Use shower chairs and bath benches. · Use a hand-held shower head that will allow you to sit while showering.   · Get into a tub or shower by putting the weaker leg in first. Get out of a tub or shower with your strong side first.  · Repair loose toilet seats and consider installing a raised toilet seat to make getting on and off the toilet easier. · Keep your bathroom door unlocked while you are in the shower. Where can you learn more? Go to http://fritz-darrell.info/. Enter 0476 [id number] in the search box to learn more about \"Preventing Falls: Care Instructions. \"  Current as of: May 12, 2017  Content Version: 11.7  © 9852-4422 [employer]. Care instructions adapted under license by [employer] (which disclaims liability or warranty for this information). If you have questions about a medical condition or this instruction, always ask your healthcare professional. [address] any warranty or liability for your use of this information.

## 2020-02-03 ENCOUNTER — HOSPITAL ENCOUNTER (INPATIENT)
Age: 85
LOS: 1 days | Discharge: HOME | DRG: 640 | End: 2020-02-04
Attending: EMERGENCY MEDICINE | Admitting: HOSPITALIST
Payer: MEDICARE

## 2020-02-03 ENCOUNTER — APPOINTMENT (OUTPATIENT)
Dept: CT IMAGING | Age: 85
DRG: 640 | End: 2020-02-03
Attending: EMERGENCY MEDICINE
Payer: MEDICARE

## 2020-02-03 ENCOUNTER — APPOINTMENT (OUTPATIENT)
Dept: GENERAL RADIOLOGY | Age: 85
DRG: 640 | End: 2020-02-03
Attending: EMERGENCY MEDICINE
Payer: MEDICARE

## 2020-02-03 DIAGNOSIS — N30.00 ACUTE CYSTITIS WITHOUT HEMATURIA: Primary | ICD-10-CM

## 2020-02-03 DIAGNOSIS — R27.0 ATAXIA: ICD-10-CM

## 2020-02-03 PROBLEM — N39.0 UTI (URINARY TRACT INFECTION): Status: ACTIVE | Noted: 2020-02-03

## 2020-02-03 PROBLEM — I10 HYPERTENSION: Status: ACTIVE | Noted: 2020-02-03

## 2020-02-03 PROBLEM — N17.9 AKI (ACUTE KIDNEY INJURY) (HCC): Status: ACTIVE | Noted: 2020-02-03

## 2020-02-03 PROBLEM — R41.0 CONFUSION: Status: ACTIVE | Noted: 2020-02-03

## 2020-02-03 PROBLEM — F99 PSYCHIATRIC DISORDER: Status: ACTIVE | Noted: 2020-02-03

## 2020-02-03 LAB
ALBUMIN SERPL-MCNC: 2.9 G/DL (ref 3.5–5)
ALBUMIN/GLOB SERPL: 0.8 {RATIO} (ref 1.1–2.2)
ALP SERPL-CCNC: 87 U/L (ref 45–117)
ALT SERPL-CCNC: 12 U/L (ref 12–78)
ANION GAP SERPL CALC-SCNC: 6 MMOL/L (ref 5–15)
APPEARANCE UR: ABNORMAL
AST SERPL-CCNC: 14 U/L (ref 15–37)
BACTERIA URNS QL MICRO: ABNORMAL /HPF
BASOPHILS # BLD: 0.1 K/UL (ref 0–0.1)
BASOPHILS NFR BLD: 1 % (ref 0–1)
BILIRUB SERPL-MCNC: 0.2 MG/DL (ref 0.2–1)
BILIRUB UR QL: NEGATIVE
BUN SERPL-MCNC: 25 MG/DL (ref 6–20)
BUN/CREAT SERPL: 15 (ref 12–20)
CALCIUM SERPL-MCNC: 9 MG/DL (ref 8.5–10.1)
CHLORIDE SERPL-SCNC: 107 MMOL/L (ref 97–108)
CO2 SERPL-SCNC: 29 MMOL/L (ref 21–32)
COLOR UR: ABNORMAL
COMMENT, HOLDF: NORMAL
COMMENT, HOLDF: NORMAL
CREAT SERPL-MCNC: 1.64 MG/DL (ref 0.55–1.02)
CREAT UR-MCNC: 62.9 MG/DL
DIFFERENTIAL METHOD BLD: ABNORMAL
EOSINOPHIL # BLD: 0.2 K/UL (ref 0–0.4)
EOSINOPHIL NFR BLD: 2 % (ref 0–7)
EPITH CASTS URNS QL MICRO: ABNORMAL /LPF
ERYTHROCYTE [DISTWIDTH] IN BLOOD BY AUTOMATED COUNT: 13.2 % (ref 11.5–14.5)
FLUAV AG NPH QL IA: NEGATIVE
FLUBV AG NOSE QL IA: NEGATIVE
GLOBULIN SER CALC-MCNC: 3.8 G/DL (ref 2–4)
GLUCOSE SERPL-MCNC: 133 MG/DL (ref 65–100)
GLUCOSE UR STRIP.AUTO-MCNC: NEGATIVE MG/DL
HCT VFR BLD AUTO: 32.4 % (ref 35–47)
HGB BLD-MCNC: 10.3 G/DL (ref 11.5–16)
HGB UR QL STRIP: NEGATIVE
HYALINE CASTS URNS QL MICRO: ABNORMAL /LPF (ref 0–5)
IMM GRANULOCYTES # BLD AUTO: 0 K/UL (ref 0–0.04)
IMM GRANULOCYTES NFR BLD AUTO: 1 % (ref 0–0.5)
KETONES UR QL STRIP.AUTO: NEGATIVE MG/DL
LACTATE SERPL-SCNC: 1.4 MMOL/L (ref 0.4–2)
LACTATE SERPL-SCNC: 2.1 MMOL/L (ref 0.4–2)
LEUKOCYTE ESTERASE UR QL STRIP.AUTO: ABNORMAL
LYMPHOCYTES # BLD: 1.9 K/UL (ref 0.8–3.5)
LYMPHOCYTES NFR BLD: 22 % (ref 12–49)
MCH RBC QN AUTO: 29.1 PG (ref 26–34)
MCHC RBC AUTO-ENTMCNC: 31.8 G/DL (ref 30–36.5)
MCV RBC AUTO: 91.5 FL (ref 80–99)
MONOCYTES # BLD: 0.8 K/UL (ref 0–1)
MONOCYTES NFR BLD: 9 % (ref 5–13)
NEUTS SEG # BLD: 5.5 K/UL (ref 1.8–8)
NEUTS SEG NFR BLD: 65 % (ref 32–75)
NITRITE UR QL STRIP.AUTO: POSITIVE
NRBC # BLD: 0 K/UL (ref 0–0.01)
NRBC BLD-RTO: 0 PER 100 WBC
PH UR STRIP: 5 [PH] (ref 5–8)
PLATELET # BLD AUTO: 300 K/UL (ref 150–400)
PMV BLD AUTO: 9.8 FL (ref 8.9–12.9)
POTASSIUM SERPL-SCNC: 3.8 MMOL/L (ref 3.5–5.1)
PROT SERPL-MCNC: 6.7 G/DL (ref 6.4–8.2)
PROT UR STRIP-MCNC: ABNORMAL MG/DL
RBC # BLD AUTO: 3.54 M/UL (ref 3.8–5.2)
RBC #/AREA URNS HPF: ABNORMAL /HPF (ref 0–5)
SAMPLES BEING HELD,HOLD: NORMAL
SAMPLES BEING HELD,HOLD: NORMAL
SODIUM SERPL-SCNC: 142 MMOL/L (ref 136–145)
SP GR UR REFRACTOMETRY: 1.02 (ref 1–1.03)
TROPONIN I SERPL-MCNC: <0.05 NG/ML
UROBILINOGEN UR QL STRIP.AUTO: 1 EU/DL (ref 0.2–1)
WBC # BLD AUTO: 8.5 K/UL (ref 3.6–11)
WBC URNS QL MICRO: ABNORMAL /HPF (ref 0–4)

## 2020-02-03 PROCEDURE — 84484 ASSAY OF TROPONIN QUANT: CPT

## 2020-02-03 PROCEDURE — 77030038269 HC DRN EXT URIN PURWCK BARD -A

## 2020-02-03 PROCEDURE — 74011000258 HC RX REV CODE- 258: Performed by: INTERNAL MEDICINE

## 2020-02-03 PROCEDURE — 77030041247 HC PROTECTOR HEEL HEELMEDIX MDII -B

## 2020-02-03 PROCEDURE — 71046 X-RAY EXAM CHEST 2 VIEWS: CPT

## 2020-02-03 PROCEDURE — 81001 URINALYSIS AUTO W/SCOPE: CPT

## 2020-02-03 PROCEDURE — 83605 ASSAY OF LACTIC ACID: CPT

## 2020-02-03 PROCEDURE — 70450 CT HEAD/BRAIN W/O DYE: CPT

## 2020-02-03 PROCEDURE — 74011250636 HC RX REV CODE- 250/636: Performed by: INTERNAL MEDICINE

## 2020-02-03 PROCEDURE — 84300 ASSAY OF URINE SODIUM: CPT

## 2020-02-03 PROCEDURE — 87804 INFLUENZA ASSAY W/OPTIC: CPT

## 2020-02-03 PROCEDURE — 85025 COMPLETE CBC W/AUTO DIFF WBC: CPT

## 2020-02-03 PROCEDURE — 74011250636 HC RX REV CODE- 250/636: Performed by: EMERGENCY MEDICINE

## 2020-02-03 PROCEDURE — 74011250637 HC RX REV CODE- 250/637: Performed by: INTERNAL MEDICINE

## 2020-02-03 PROCEDURE — 87077 CULTURE AEROBIC IDENTIFY: CPT

## 2020-02-03 PROCEDURE — 65660000000 HC RM CCU STEPDOWN

## 2020-02-03 PROCEDURE — 80053 COMPREHEN METABOLIC PANEL: CPT

## 2020-02-03 PROCEDURE — 82570 ASSAY OF URINE CREATININE: CPT

## 2020-02-03 PROCEDURE — 93005 ELECTROCARDIOGRAM TRACING: CPT

## 2020-02-03 PROCEDURE — 87086 URINE CULTURE/COLONY COUNT: CPT

## 2020-02-03 PROCEDURE — 87186 SC STD MICRODIL/AGAR DIL: CPT

## 2020-02-03 PROCEDURE — 99285 EMERGENCY DEPT VISIT HI MDM: CPT

## 2020-02-03 PROCEDURE — 96372 THER/PROPH/DIAG INJ SC/IM: CPT

## 2020-02-03 PROCEDURE — 36415 COLL VENOUS BLD VENIPUNCTURE: CPT

## 2020-02-03 PROCEDURE — 51701 INSERT BLADDER CATHETER: CPT

## 2020-02-03 RX ORDER — RANITIDINE 150 MG/1
150 TABLET, FILM COATED ORAL 2 TIMES DAILY
Status: DISCONTINUED | OUTPATIENT
Start: 2020-02-03 | End: 2020-02-03

## 2020-02-03 RX ORDER — SODIUM CHLORIDE 0.9 % (FLUSH) 0.9 %
5-40 SYRINGE (ML) INJECTION AS NEEDED
Status: DISCONTINUED | OUTPATIENT
Start: 2020-02-03 | End: 2020-02-04 | Stop reason: HOSPADM

## 2020-02-03 RX ORDER — MELATONIN
1000 DAILY
Status: DISCONTINUED | OUTPATIENT
Start: 2020-02-04 | End: 2020-02-03

## 2020-02-03 RX ORDER — GUAIFENESIN 100 MG/5ML
81 LIQUID (ML) ORAL DAILY
Status: DISCONTINUED | OUTPATIENT
Start: 2020-02-04 | End: 2020-02-03

## 2020-02-03 RX ORDER — POLYETHYLENE GLYCOL 3350 17 G/17G
17 POWDER, FOR SOLUTION ORAL DAILY
Status: DISCONTINUED | OUTPATIENT
Start: 2020-02-04 | End: 2020-02-04 | Stop reason: HOSPADM

## 2020-02-03 RX ORDER — NALOXONE HYDROCHLORIDE 0.4 MG/ML
0.4 INJECTION, SOLUTION INTRAMUSCULAR; INTRAVENOUS; SUBCUTANEOUS AS NEEDED
Status: DISCONTINUED | OUTPATIENT
Start: 2020-02-03 | End: 2020-02-04 | Stop reason: HOSPADM

## 2020-02-03 RX ORDER — MAG HYDROX/ALUMINUM HYD/SIMETH 200-200-20
15 SUSPENSION, ORAL (FINAL DOSE FORM) ORAL
Status: DISCONTINUED | OUTPATIENT
Start: 2020-02-03 | End: 2020-02-03

## 2020-02-03 RX ORDER — SODIUM CHLORIDE 9 MG/ML
75 INJECTION, SOLUTION INTRAVENOUS CONTINUOUS
Status: DISCONTINUED | OUTPATIENT
Start: 2020-02-03 | End: 2020-02-04 | Stop reason: HOSPADM

## 2020-02-03 RX ORDER — SODIUM CHLORIDE 0.9 % (FLUSH) 0.9 %
5-40 SYRINGE (ML) INJECTION EVERY 8 HOURS
Status: DISCONTINUED | OUTPATIENT
Start: 2020-02-03 | End: 2020-02-04 | Stop reason: HOSPADM

## 2020-02-03 RX ORDER — ZIPRASIDONE MESYLATE 20 MG/ML
20 INJECTION, POWDER, LYOPHILIZED, FOR SOLUTION INTRAMUSCULAR
Status: COMPLETED | OUTPATIENT
Start: 2020-02-03 | End: 2020-02-03

## 2020-02-03 RX ORDER — HYDROXYZINE 25 MG/1
12.5 TABLET, FILM COATED ORAL DAILY
Status: DISCONTINUED | OUTPATIENT
Start: 2020-02-04 | End: 2020-02-03

## 2020-02-03 RX ORDER — QUETIAPINE FUMARATE 25 MG/1
12.5 TABLET, FILM COATED ORAL 2 TIMES DAILY
Status: DISCONTINUED | OUTPATIENT
Start: 2020-02-03 | End: 2020-02-03

## 2020-02-03 RX ORDER — ACETAMINOPHEN 325 MG/1
650 TABLET ORAL
Status: DISCONTINUED | OUTPATIENT
Start: 2020-02-03 | End: 2020-02-04 | Stop reason: HOSPADM

## 2020-02-03 RX ORDER — THERA TABS 400 MCG
1 TAB ORAL DAILY
Status: DISCONTINUED | OUTPATIENT
Start: 2020-02-04 | End: 2020-02-03

## 2020-02-03 RX ORDER — SERTRALINE HYDROCHLORIDE 100 MG/1
100 TABLET, FILM COATED ORAL EVERY EVENING
COMMUNITY

## 2020-02-03 RX ORDER — DOCUSATE SODIUM 100 MG/1
100 CAPSULE, LIQUID FILLED ORAL
Status: DISCONTINUED | OUTPATIENT
Start: 2020-02-03 | End: 2020-02-04 | Stop reason: HOSPADM

## 2020-02-03 RX ORDER — LORAZEPAM 1 MG/1
1 TABLET ORAL
Status: DISCONTINUED | OUTPATIENT
Start: 2020-02-03 | End: 2020-02-03

## 2020-02-03 RX ORDER — SERTRALINE HYDROCHLORIDE 50 MG/1
100 TABLET, FILM COATED ORAL EVERY EVENING
Status: DISCONTINUED | OUTPATIENT
Start: 2020-02-03 | End: 2020-02-04 | Stop reason: HOSPADM

## 2020-02-03 RX ORDER — HEPARIN SODIUM 5000 [USP'U]/ML
5000 INJECTION, SOLUTION INTRAVENOUS; SUBCUTANEOUS EVERY 8 HOURS
Status: DISCONTINUED | OUTPATIENT
Start: 2020-02-03 | End: 2020-02-04 | Stop reason: HOSPADM

## 2020-02-03 RX ORDER — QUETIAPINE FUMARATE 25 MG/1
37.5 TABLET, FILM COATED ORAL
Status: DISCONTINUED | OUTPATIENT
Start: 2020-02-03 | End: 2020-02-03

## 2020-02-03 RX ORDER — LANOLIN ALCOHOL/MO/W.PET/CERES
3 CREAM (GRAM) TOPICAL
Status: DISCONTINUED | OUTPATIENT
Start: 2020-02-03 | End: 2020-02-04 | Stop reason: HOSPADM

## 2020-02-03 RX ORDER — FACIAL-BODY WIPES
10 EACH TOPICAL
Status: DISCONTINUED | OUTPATIENT
Start: 2020-02-03 | End: 2020-02-03

## 2020-02-03 RX ORDER — DOCUSATE SODIUM 100 MG/1
100 CAPSULE, LIQUID FILLED ORAL 2 TIMES DAILY
Status: DISCONTINUED | OUTPATIENT
Start: 2020-02-03 | End: 2020-02-03

## 2020-02-03 RX ADMIN — SERTRALINE 100 MG: 50 TABLET, FILM COATED ORAL at 21:41

## 2020-02-03 RX ADMIN — ZIPRASIDONE MESYLATE 20 MG: 20 INJECTION, POWDER, LYOPHILIZED, FOR SOLUTION INTRAMUSCULAR at 12:51

## 2020-02-03 RX ADMIN — ACETAMINOPHEN 650 MG: 325 TABLET ORAL at 22:03

## 2020-02-03 RX ADMIN — MELATONIN TAB 3 MG 3 MG: 3 TAB at 21:41

## 2020-02-03 RX ADMIN — SODIUM CHLORIDE 500 ML: 900 INJECTION, SOLUTION INTRAVENOUS at 13:46

## 2020-02-03 RX ADMIN — HEPARIN SODIUM 5000 UNITS: 5000 INJECTION INTRAVENOUS; SUBCUTANEOUS at 21:40

## 2020-02-03 RX ADMIN — AZTREONAM 1 G: 1 INJECTION, POWDER, LYOPHILIZED, FOR SOLUTION INTRAMUSCULAR; INTRAVENOUS at 17:57

## 2020-02-03 RX ADMIN — Medication 10 ML: at 17:58

## 2020-02-03 RX ADMIN — SODIUM CHLORIDE 75 ML/HR: 900 INJECTION, SOLUTION INTRAVENOUS at 17:57

## 2020-02-03 RX ADMIN — Medication 10 ML: at 21:41

## 2020-02-03 NOTE — ED NOTES
Bedside/verbal shift change report given to Winn Scheuermann, RN (oncoming nurse) by Lexa Peace RN (offgoing nurse). Report included the following information SBAR, Kardex, ED Summary, Intake/Output, MAR, Recent Results and Cardiac Rhythm Normal Sinus rhythm.

## 2020-02-03 NOTE — ED PROVIDER NOTES
Mrs. Chely Valle is 8-year-old female who presents to the ER with her son and a caregiver with a change in her mental status. Normally, she is demented but is ambulatory. She speaks in full sentences but constantly walks around her memory facility. She has occasionally had episodes in the past when she passed out. According to her son, she had 2 syncopal events yesterday. She had a blood pressure checked in the morning and the systolics were in the 91Q and 90s. Per the son, they discontinued her diuretic at that time. She has been unable to get up and walk around unassisted for the last 2 days. She has been grimacing some and holding her stomach. No known fevers. No known vomiting. Apparently, she ate well yesterday. She has had multiple large bowel movements over the last 2 days. Apparently, she normally is able speak in full sentences. However, the sentences usually have very little to do with reality. Today, her speech has been incomprehensible by her son. History is limited by the patient's mental status. Past Medical History:   Diagnosis Date    Diverticulitis     Gastrointestinal disorder diverticulitis    Hypertension     Psychiatric disorder     anxiety       Past Surgical History:   Procedure Laterality Date    ABDOMEN SURGERY PROC UNLISTED  unable to recall    colon resection         No family history on file.     Social History     Socioeconomic History    Marital status:      Spouse name: Not on file    Number of children: Not on file    Years of education: Not on file    Highest education level: Not on file   Occupational History    Not on file   Social Needs    Financial resource strain: Not on file    Food insecurity:     Worry: Not on file     Inability: Not on file    Transportation needs:     Medical: Not on file     Non-medical: Not on file   Tobacco Use    Smoking status: Never Smoker    Smokeless tobacco: Never Used   Substance and Sexual Activity    Alcohol use: No    Drug use: No    Sexual activity: Not Currently   Lifestyle    Physical activity:     Days per week: Not on file     Minutes per session: Not on file    Stress: Not on file   Relationships    Social connections:     Talks on phone: Not on file     Gets together: Not on file     Attends [employer] service: Not on file     Active member of club or organization: Not on file     Attends meetings of clubs or organizations: Not on file     Relationship status: Not on file    Intimate partner violence:     Fear of current or ex partner: Not on file     Emotionally abused: Not on file     Physically abused: Not on file     Forced sexual activity: Not on file   Other Topics Concern    Not on file   Social History Narrative    Not on file         ALLERGIES: Dilaudid [hydromorphone (bulk)]; Keflex [cephalexin]; and Morphine    Review of Systems   Unable to perform ROS: Dementia   Gastrointestinal: Positive for diarrhea. Neurological: Positive for syncope. Vitals:    02/03/20 1125 02/03/20 1130 02/03/20 1145   BP: (!) 124/94 (!) 124/94 104/52   Pulse: 81     Resp: 18 (!) 32 26   Temp: 97.3 °F (36.3 °C)     SpO2: 100%     Weight: 54.4 kg (119 lb 14.9 oz)     Height: 4' 11\" (1.499 m)              Physical Exam     Vital signs reviewed. Nursing notes reviewed.     Const:  No acute distress, well developed, well nourished  Head:  Atraumatic, normocephalic  Eyes:  PERRL, conjunctiva normal, no scleral icterus  Neck:  Supple, trachea midline  Cardiovascular:  RRR, no murmurs, no gallops, no rubs  Resp:  No resp distress, no increased work of breathing, no wheezes, no rhonchi, no rales,  Abd:  Soft, non-tender, non-distended, no rebound, no guarding, no CVA tenderness  MSK:  No pedal edema, normal ROM  Neuro:  Alert and oriented x0, alert, no cranial nerve defect, unable to answer questions or follow commands  Skin:  Warm, dry, intact  Psych: smiling and pleasant during the exam, repeatedly was kissing her son and trying to hold my hand and her son's hand        MDM  Number of Diagnoses or Management Options  Acute cystitis without hematuria:   Ataxia:      Amount and/or Complexity of Data Reviewed  Clinical lab tests: ordered and reviewed  Tests in the radiology section of CPT®: ordered and reviewed  Review and summarize past medical records: yes    Patient Progress  Patient progress: stable          Ms. Guillermina Love is 8-year-old female who presents to the ER with mental status changes and had difficulty walking. She was found to have a urinary tract infection. No other obvious focal deficits on exam.  No bleed or mass on head CT. I have started her on antibiotics.   She is to be evaluated for admission by the hospitalist.      Procedures

## 2020-02-03 NOTE — ED NOTES
Pt tolerated EKG/lab draw/nasal specimen collection, however continues to pinch & kick staff. Son remains @ bedside. MD Diandra Sneed made aware urine collection is still awaiting.

## 2020-02-03 NOTE — PROGRESS NOTES
[facility] Dosing Services: Antimicrobial Stewardship Daily Doc 2/3/2020    Consult for antibiotic dosing of Aztreonam by Dr. Carol Main  Indication: UTI   Day of Therapy 1    Ht Readings from Last 1 Encounters:   02/03/20 149.9 cm (59\")        Wt Readings from Last 1 Encounters:   02/03/20 54.4 kg (119 lb 14.9 oz)      Non-Kinetic Antimicrobial Dosing Regimen:   Current Regimen:  Aztreonam- Pharmacy to dose  Recommendation: Aztreonam 1 gram every 8 hours for CrCl >10 ml/min  Dose administration notes:   Doses given appropriately as scheduled    Other Antimicrobial   (not dosed by pharmacist) None   Cultures 2/3 Urine: Pending  2/3 Flu A and B rapid: Negative   Serum Creatinine Lab Results   Component Value Date/Time    Creatinine 1.64 (H) 02/03/2020 01:43 PM    Creatinine (POC) 1.3 07/26/2014 03:01 PM         Creatinine Clearance Estimated Creatinine Clearance: 13.7 mL/min (A) (based on SCr of 1.64 mg/dL (H)).      Temp Temp: 97.3 °F (36.3 °C)       WBC Lab Results   Component Value Date/Time    WBC 8.5 02/03/2020 01:43 PM        H/H Lab Results   Component Value Date/Time    HGB 10.3 (L) 02/03/2020 01:43 PM        Platelets    Lab Results   Component Value Date/Time    PLATELET 998 54/09/9873 01:43 PM          Moon,  Pharmacist Amanda Mitchell, PHARMD Contact information: [phone]

## 2020-02-03 NOTE — PROGRESS NOTES
Admission Medication Reconciliation:    Comments/Recommendations:  -Medication history obtained in the emergency department (room 15)  -Added preferred pharmacy location to chart  -Recent Rx fill for amlodipine in January (not on PTA med list)  - confirmed with son this was stopped    Medications added: None  Medications removed: Maalox PRN, aspirin daily, bisacodyl PRN, vitamin D, glycerin supp, hydroxyzine, lorazepam, naloxone spray, triple antibiotic ointment, Metamucil, Seroquel (both the BID and qhs orders), Zantac, therapeutic MVI  Medications changed: Colace from BID to BID PRN, Miralax from BID to daily, sertraline from daily in AM to evening    Information obtained from: Patient's son in room, chart review, RxQuery    Significant PMH/Disease States:   Past Medical History:   Diagnosis Date    Diverticulitis     Gastrointestinal disorder diverticulitis    Hypertension     Psychiatric disorder     anxiety    TIA (transient ischemic attack)     per pt son       Chief Complaint for this Admission:    Chief Complaint   Patient presents with    Dizziness       Allergies:  Dilaudid [hydromorphone (bulk)]; Keflex [cephalexin]; and Morphine    Prior to Admission Medications:   Prior to Admission Medications   Prescriptions Last Dose Informant Patient Reported? Taking?   docusate sodium (COLACE) 100 mg capsule 1/3/2020 at Unknown time  Yes Yes   Sig: Take 100 mg by mouth two (2) times daily as needed for Constipation. polyethylene glycol (MIRALAX) 17 gram packet 2/2/2020 at Unknown time  Yes Yes   Sig: Take 17 g by mouth daily. sertraline (ZOLOFT) 100 mg tablet 2/2/2020  Yes Yes   Sig: Take 100 mg by mouth every evening.       Facility-Administered Medications: None     Thank you,  Mumtaz Jamil, LUCYD

## 2020-02-03 NOTE — H&P
[facility]  [address], [facility]  [phone]    Hospitalist Admission Note      NAME:  Silva Devries   :   3/17/1918   MRN:  [id number]     PCP:  Inez Cano MD     Date/Time:  2/3/2020 5:52 PM         Assessment / Plan:          Acute metabolic encephalopathy / weakness Confusion appears improved. Son reports nebulous history of possible syncope. Per family at bedside, already improved in ED after IVF. Suspect her symptoms are 2/2 UTI and IVVD. Treat UTI, start IVF. PT/OT, fall precautions      UTI (urinary tract infection):  Noted allergy to Keflex (hives). As such, use aztreonam for now. Hopefully urine culture will be sensitive to quinolones. ANGELY (acute kidney injury): check FENa. Suspect this will improve with IVF. If not, further workup (e.g. renal US, nephrology consult, etc)       Dementia / anxiety: was agitated in ED requiring Geodon. On Seroquel, hydroxyzine, and Sertraline at home which we will continue. Check QTc. At risk for further delirium in hospital, would try to DC home (memory care center) ASAP. Minimize benzos (already on Ativan at home), opioids, anticholinergics. Verbally re-direct whenever possible. Avoid chemical restraints unless pt is a danger to self or others. Son is staying with patient tonight. Hypertension: BP fluctuating, but overall stable. NOT on antihypertensives. Monitor closely      ADDENDUM:  Reviewed medication reconciliation.  Discussed with pharmacist.  \"Medications added: None  Medications removed: Maalox PRN, aspirin daily, bisacodyl PRN, vitamin D, glycerin supp, hydroxyzine, lorazepam, naloxone spray, triple antibiotic ointment, Metamucil, Seroquel (both the BID and qhs orders), Zantac, therapeutic MVI  Medications changed: Colace from BID to BID PRN, Miralax from BID to daily, sertraline from daily in AM to evening\"    Code Status: DNR, d/w family     Surrogate decision maker: son      ED notes and lab results reviewed. Total time spent with patient: 79 Minutes   Time spent in the care of this patient included reviewing records, discussing with nursing, obtaining history and examining the patient, and discussing treatment plans, with >50% time spent counseling/coordinating care    Risk of deterioration: High                 Care Plan discussed with: ED provider, Patient, Family, Nursing Staff and >50% of time spent in counseling and coordination of care    Discussed:  Code Status, Care Plan and D/C Planning    Prophylaxis:  Hep SQ    Disposition:  [facility]                 Subjective:     CHIEF COMPLAINT: weakness, confusion, syncope    HISTORY OF PRESENT ILLNESS:     Ms. Otto Middleton is a 80 y.o. female w/ hx of dementia, HTN who presents with syncope. Per son, may have passed out twice in [facility] yesterday however details uncertain. Noting reports of worsening confusion and weakness. No fevers, chills, or known HA, CP, SOB, abd pain, n/v. ? diarrhea. ED workup showed severe pyuria and 4+ bacteriuria. Ms. Otto Middleton is admitted for further evaluation and management. Past Medical History:   Diagnosis Date    Diverticulitis     Gastrointestinal disorder diverticulitis    Hypertension     Psychiatric disorder     anxiety    TIA (transient ischemic attack)     per pt son        Past Surgical History:   Procedure Laterality Date    ABDOMEN SURGERY PROC UNLISTED  unable to recall    colon resection       Social History     Tobacco Use    Smoking status: Never Smoker    Smokeless tobacco: Never Used   Substance Use Topics    Alcohol use: No        Family History: family history of HTN    Allergies   Allergen Reactions    Dilaudid [Hydromorphone (Bulk)] Hives    Keflex [Cephalexin] Hives    Morphine Unknown (comments)        Prior to Admission medications    Medication Sig Start Date End Date Taking?  Authorizing Provider   sertraline (ZOLOFT) 100 mg tablet Take  by mouth daily. Yes Yoly, MD Denisse   psyllium (METAMUCIL) powd Take  by mouth daily. Yes Yoly, MD Denisse   docusate sodium (COLACE) 100 mg capsule Take 100 mg by mouth two (2) times a day. Yes Provider, Historical   naloxone (NARCAN) 4 mg/actuation nasal spray Use 1 spray intranasally, then discard. Repeat with new spray every 2 min as needed for opioid overdose symptoms, alternating nostrils. 9/6/18   Josie Ravi MD   QUEtiapine (SEROQUEL) 25 mg tablet Take 37.5 mg by mouth nightly. Provider, Historical   LORazepam (ATIVAN) 0.5 mg tablet Take 1 mg by mouth daily as needed for Anxiety. Provider, Historical   bisacodyl (BISCOLAX) 10 mg suppository Insert 10 mg into rectum daily as needed. Provider, Historical   glycerin, Adult, suppository Insert 1 Suppository into rectum daily as needed. Provider, Historical   hydrOXYzine (ATARAX) 25 mg tablet Take 12.5 mg by mouth daily. Provider, Historical   alum-mag hydroxide-simeth (MAALOX ADVANCED) 200-200-20 mg/5 mL susp Take 15 mL by mouth four (4) times daily as needed (Indigestion). Provider, Historical   QUEtiapine (SEROQUEL) 25 mg tablet Take 12.5 mg by mouth two (2) times a day. Dementia with behaviors    Provider, Historical   therapeutic multivitamin (THERA) tablet Take 1 Tab by mouth daily. Provider, Historical   neomycin-bacitracin-polymyxin (TRIPLE ANTIBIOTIC) 3.5-400-5,000 mg-unit-unit/g ointment Apply  to affected area daily as needed. Provider, Historical   cholecalciferol (VITAMIN D3) 1,000 unit tablet Take 1,000 Units by mouth daily. Provider, Historical   ranitidine (ZANTAC) 150 mg tablet Take 150 mg by mouth two (2) times a day. Denisse Frederick MD   polyethylene glycol (MIRALAX) 17 gram packet Take 17 g by mouth two (2) times a day. Provider, Historical   aspirin 81 mg chewable tablet Take 81 mg by mouth daily.     Yoly, MD Denisse       Review of Systems:  (bold if positive, if negative)    Gen:  fatigueEyes:  ENT:  CVS: Pulm:  GI:  diarrheaGU:  MS:  weaknessSkin:  Psych:  Endo:  Hem:  Renal:  Neuro:            Objective:      VITALS:    Vital signs reviewed; most recent are:    Visit Vitals  /71   Pulse 74   Temp 97.3 °F (36.3 °C)   Resp 20   Ht 4' 11\" (1.499 m)   Wt 54.4 kg (119 lb 14.9 oz)   SpO2 98%   BMI 24.22 kg/m²     SpO2 Readings from Last 6 Encounters:   02/03/20 98%   09/06/18 97%   06/18/18 98%   03/25/18 97%   03/24/18 95%   10/08/17 98%            Intake/Output Summary (Last 24 hours) at 2/3/2020 1752  Last data filed at 2/3/2020 1416  Gross per 24 hour   Intake 500 ml   Output    Net 500 ml            Exam:     Physical Exam:    Gen:  Elderly, frail. NAD. Not cooperative with exam  HEENT:  No scleral icterus, hearing intact to voice, moist mucous membranes  Neck:  Supple  Resp:  No accessory muscle use. CTAB without wheezing, rales, rhonchi  Card: RRR. Normal S1 and S2 without murmurs, rubs, or gallops. No peripheral lower extremity edema. No JVD. Peripheral pulses in tact. Abd:  Normoactive bowel sounds. Soft, non-tender, non-distended. No rebound, no guarding. No appreciable hepatosplenomegaly   Musc:  No cyanosis or clubbing  Skin:  No rashes or ulcers; turgor intact. Neuro:  Cranial nerves are grossly intact, generalized motor weakness however certainly no focal weakness as she pushes off against resistance with BUE and BLEs. Does not follow commands appropriately  Psych:  Poor insight, normal affect. Alert, oriented to self. Does not answer questions appropriately       Labs:    Recent Labs     02/03/20  1343   WBC 8.5   HGB 10.3*   HCT 32.4*        Recent Labs     02/03/20  1343      K 3.8      CO2 29   *   BUN 25*   CREA 1.64*   CA 9.0   ALB 2.9*   SGOT 14*   ALT 12     No components found for: GLPOC  No results for input(s): PH, PCO2, PO2, HCO3, FIO2 in the last 72 hours. No results for input(s): INR, INREXT in the last 72 hours.   Lab Results   Component Value Date/Time Specimen Description: URINE 01/28/2013 01:50 PM    Specimen Description: URINE 01/13/2011 10:51 AM    Specimen Description: BLOOD 01/10/2011 09:00 PM     Lab Results   Component Value Date/Time    Culture result: MRSA PRESENT 11/23/2014 08:19 PM    Culture result:  11/23/2014 08:19 PM         Screening of patient nares for MRSA is for surveillance purposes and, if positive, to facilitate isolation considerations in high risk settings. It is not intended for automatic decolonization interventions per se as regimens are not sufficiently effective to warrant routine use. Culture result: NO GROWTH 5 DAYS 11/23/2014 09:02 AM     All other current labs reviewed in the computer. Imaging/Studies:    Xr Chest Pa Lat    Result Date: 2/3/2020  IMPRESSION: No acute intrathoracic disease. Ct Head Wo Cont    Result Date: 2/3/2020  IMPRESSION: 1. No evidence of intracranial hemorrhage. 2. 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 3. Presence of subtle periventricular low density suggestive of white matter disease. 4. Evidence of moderate cerebral atrophy. CXR imaging personally reviewed.     ___________________________________________________    Attending Physician: Nino Grimes MD

## 2020-02-03 NOTE — ED TRIAGE NOTES
Pt arrives via EMS from [facility]. Pt experienced a syncopal episode last night. Pt has had diarrhea recently.

## 2020-02-04 VITALS
HEART RATE: 78 BPM | TEMPERATURE: 97.6 F | BODY MASS INDEX: 24.18 KG/M2 | RESPIRATION RATE: 19 BRPM | OXYGEN SATURATION: 97 % | HEIGHT: 59 IN | SYSTOLIC BLOOD PRESSURE: 153 MMHG | WEIGHT: 119.93 LBS | DIASTOLIC BLOOD PRESSURE: 70 MMHG

## 2020-02-04 LAB
ALBUMIN SERPL-MCNC: 2.5 G/DL (ref 3.5–5)
ALBUMIN/GLOB SERPL: 0.7 {RATIO} (ref 1.1–2.2)
ALP SERPL-CCNC: 73 U/L (ref 45–117)
ALT SERPL-CCNC: 11 U/L (ref 12–78)
ANION GAP SERPL CALC-SCNC: 9 MMOL/L (ref 5–15)
AST SERPL-CCNC: 18 U/L (ref 15–37)
ATRIAL RATE: 70 BPM
BASOPHILS # BLD: 0.1 K/UL (ref 0–0.1)
BASOPHILS NFR BLD: 1 % (ref 0–1)
BILIRUB SERPL-MCNC: 0.3 MG/DL (ref 0.2–1)
BUN SERPL-MCNC: 20 MG/DL (ref 6–20)
BUN/CREAT SERPL: 16 (ref 12–20)
CALCIUM SERPL-MCNC: 8.4 MG/DL (ref 8.5–10.1)
CALCULATED P AXIS, ECG09: 56 DEGREES
CALCULATED R AXIS, ECG10: 20 DEGREES
CALCULATED T AXIS, ECG11: 106 DEGREES
CHLORIDE SERPL-SCNC: 111 MMOL/L (ref 97–108)
CO2 SERPL-SCNC: 22 MMOL/L (ref 21–32)
CREAT SERPL-MCNC: 1.25 MG/DL (ref 0.55–1.02)
DIAGNOSIS, 93000: NORMAL
DIFFERENTIAL METHOD BLD: ABNORMAL
EOSINOPHIL # BLD: 0.3 K/UL (ref 0–0.4)
EOSINOPHIL NFR BLD: 3 % (ref 0–7)
ERYTHROCYTE [DISTWIDTH] IN BLOOD BY AUTOMATED COUNT: 13 % (ref 11.5–14.5)
GLOBULIN SER CALC-MCNC: 3.5 G/DL (ref 2–4)
GLUCOSE SERPL-MCNC: 105 MG/DL (ref 65–100)
HCT VFR BLD AUTO: 31.9 % (ref 35–47)
HGB BLD-MCNC: 10 G/DL (ref 11.5–16)
IMM GRANULOCYTES # BLD AUTO: 0 K/UL (ref 0–0.04)
IMM GRANULOCYTES NFR BLD AUTO: 0 % (ref 0–0.5)
LYMPHOCYTES # BLD: 2.5 K/UL (ref 0.8–3.5)
LYMPHOCYTES NFR BLD: 32 % (ref 12–49)
MAGNESIUM SERPL-MCNC: 2 MG/DL (ref 1.6–2.4)
MCH RBC QN AUTO: 28.7 PG (ref 26–34)
MCHC RBC AUTO-ENTMCNC: 31.3 G/DL (ref 30–36.5)
MCV RBC AUTO: 91.4 FL (ref 80–99)
MONOCYTES # BLD: 0.8 K/UL (ref 0–1)
MONOCYTES NFR BLD: 10 % (ref 5–13)
NEUTS SEG # BLD: 4.3 K/UL (ref 1.8–8)
NEUTS SEG NFR BLD: 54 % (ref 32–75)
NRBC # BLD: 0 K/UL (ref 0–0.01)
NRBC BLD-RTO: 0 PER 100 WBC
P-R INTERVAL, ECG05: 124 MS
PHOSPHATE SERPL-MCNC: 3 MG/DL (ref 2.6–4.7)
PLATELET # BLD AUTO: 270 K/UL (ref 150–400)
PMV BLD AUTO: 9.8 FL (ref 8.9–12.9)
POTASSIUM SERPL-SCNC: 3.9 MMOL/L (ref 3.5–5.1)
PROT SERPL-MCNC: 6 G/DL (ref 6.4–8.2)
Q-T INTERVAL, ECG07: 390 MS
QRS DURATION, ECG06: 90 MS
QTC CALCULATION (BEZET), ECG08: 421 MS
RBC # BLD AUTO: 3.49 M/UL (ref 3.8–5.2)
SODIUM SERPL-SCNC: 142 MMOL/L (ref 136–145)
SODIUM UR-SCNC: 147 MMOL/L
VENTRICULAR RATE, ECG03: 70 BPM
WBC # BLD AUTO: 7.9 K/UL (ref 3.6–11)

## 2020-02-04 PROCEDURE — 84100 ASSAY OF PHOSPHORUS: CPT

## 2020-02-04 PROCEDURE — 74011250637 HC RX REV CODE- 250/637: Performed by: INTERNAL MEDICINE

## 2020-02-04 PROCEDURE — 36415 COLL VENOUS BLD VENIPUNCTURE: CPT

## 2020-02-04 PROCEDURE — 74011000258 HC RX REV CODE- 258: Performed by: INTERNAL MEDICINE

## 2020-02-04 PROCEDURE — 74011250637 HC RX REV CODE- 250/637: Performed by: HOSPITALIST

## 2020-02-04 PROCEDURE — 74011250636 HC RX REV CODE- 250/636: Performed by: INTERNAL MEDICINE

## 2020-02-04 PROCEDURE — 83735 ASSAY OF MAGNESIUM: CPT

## 2020-02-04 PROCEDURE — 74011250636 HC RX REV CODE- 250/636: Performed by: HOSPITALIST

## 2020-02-04 PROCEDURE — 74011000258 HC RX REV CODE- 258: Performed by: HOSPITALIST

## 2020-02-04 PROCEDURE — 85025 COMPLETE CBC W/AUTO DIFF WBC: CPT

## 2020-02-04 PROCEDURE — 80053 COMPREHEN METABOLIC PANEL: CPT

## 2020-02-04 RX ORDER — LORAZEPAM 2 MG/ML
1 INJECTION INTRAMUSCULAR ONCE
Status: COMPLETED | OUTPATIENT
Start: 2020-02-04 | End: 2020-02-04

## 2020-02-04 RX ORDER — HYDRALAZINE HYDROCHLORIDE 20 MG/ML
10 INJECTION INTRAMUSCULAR; INTRAVENOUS
Status: DISCONTINUED | OUTPATIENT
Start: 2020-02-04 | End: 2020-02-04 | Stop reason: HOSPADM

## 2020-02-04 RX ORDER — HALOPERIDOL 1 MG/1
1 TABLET ORAL 2 TIMES DAILY
Status: DISCONTINUED | OUTPATIENT
Start: 2020-02-04 | End: 2020-02-04 | Stop reason: HOSPADM

## 2020-02-04 RX ADMIN — HEPARIN SODIUM 5000 UNITS: 5000 INJECTION INTRAVENOUS; SUBCUTANEOUS at 06:22

## 2020-02-04 RX ADMIN — Medication 10 ML: at 05:15

## 2020-02-04 RX ADMIN — HALOPERIDOL 1 MG: 1 TABLET ORAL at 12:01

## 2020-02-04 RX ADMIN — AZTREONAM 1 G: 1 INJECTION, POWDER, LYOPHILIZED, FOR SOLUTION INTRAMUSCULAR; INTRAVENOUS at 01:25

## 2020-02-04 RX ADMIN — POLYETHYLENE GLYCOL 3350 17 G: 17 POWDER, FOR SOLUTION ORAL at 09:36

## 2020-02-04 RX ADMIN — LORAZEPAM 1 MG: 2 INJECTION INTRAMUSCULAR; INTRAVENOUS at 02:27

## 2020-02-04 RX ADMIN — CEFTRIAXONE SODIUM 1 G: 1 INJECTION, POWDER, FOR SOLUTION INTRAMUSCULAR; INTRAVENOUS at 09:36

## 2020-02-04 RX ADMIN — HYDRALAZINE HYDROCHLORIDE 10 MG: 20 INJECTION INTRAMUSCULAR; INTRAVENOUS at 11:54

## 2020-02-04 NOTE — PHYSICIAN ADVISORY
[facility] Physician Advisor Recommendation The information in this document is a recommendation to be used for utilization review and utilization management purposes only. This recommendation is not an order. The recommendation is made based on the information reviewed at the time of the referral, is pursuant to the Douglass NORIEGA SQUIBB [facility] Conditions of Participation (42 CFR Part 482), and is neither a judgment nor an assessment with regard to the appropriateness or quality of clinical care. Nothing in this document may be used to limit, alter, or affect clinical services provided to the patient named below. The provider of services is ultimately responsible for the submission of a claim that has met all requirements for correct coding, billing, and reimbursement. Letter of Status Determination:   
Recommend Changing patient status from INPATIENT to OBSERVATION Pt Name:  Jayce Price MR#  [id number] [facility]#   [id number] [facility]  520/01  @ [address] [place] [address] Hospitalization date  2/3/2020 11:23 AM  
Current Attending Physician  Fawad Gauthier MD  
Principal diagnosis  <principal problem not specified> Clinicals  81 yo female with PMHx of Dementia, who is active and ambulates without assistance admitted from memory [facility] for episodes of unresponsiveness and low BP. Pt was on Diuretics which were discontinued 
  
Acute metabolic encephalopathy due to hypovolemia and dehydration 
- improved with IVF 
- Cr improved - BP Better 
  
ANGELY on CKD 3 
- Cr improved with hydration 
- avoid dehydration 
- encourage PO  
  
Dementia with behavioral disturbance 
- agitated and required Geodon in ER and Haldol  
- try and minimize sedatives 
- re-orient - d/w son at bedside, hiring extra help to prevent falls - Zoloft at night  
  
HTN, elevated due to agitation 
- better with PRN medications 
  
Asymptomatic Bacteriuria - no fever, leukocytosis - d/c antibiotics, d/w son at [facility] criteria Does  NOT  apply STATUS DETERMINATION  On the basis of review of available clinical data, documentation in the chart  It is our recommendation that the patient's status is changed from INPATIENT to OBSERVATION The final decision of the patient's hospitalization status depends on the attending physician's judgment Additional comments Insurance  Payor: John Cardenas / Plan: Jose Juan LuckyPenniebiju / Product Type: Medicare /   
 
  
 
Sebastián Alvarez MD, BARBER Physician [address] [place] [address] Insurance Information [facility] Phone:   
 Subscriber: Yuniel Ba Subscriber#: [id number] Group#:  Precert#:   
   
 [facility]/[address] Phone:   
 Subscriber: Yuniel Ba Subscriber#: [id number]  Group#: [id number] Precert#:

## 2020-02-04 NOTE — PROGRESS NOTES
Occupational therapy note:  Orders received, chart reviewed. Per RN patient agitated and aggressive with staff members. Rn reports patient to return to memory unit later today. Will follow up with patient at later time if able. Elba Wheeler MS OTR/L

## 2020-02-04 NOTE — PROGRESS NOTES
Physical Therapy:  Patient continues to be agitated and aggressive with staff members per RN. RN reports she is planning on returning to memory unit today. We will re-attempt later as able, once she is able to tolerate evaluation (if able).   Alisia Palmer PT,DPT,NCS

## 2020-02-04 NOTE — PROGRESS NOTES
1930: Bedside and Verbal shift change report given to Maynor Moore RN (oncoming nurse) by Tash Rios RN (offgoing nurse). Report included the following information SBAR, Kardex, MAR, and Accordion. 0220: Pt restless, pulling at IV and trying to get out of bed. Personal aid at bedside but unable to keep patient in bed. Orders received for 1 mg IV ativan once from Dr. Ricky Ashley.   7579: Pts BP elevated at 190/72, orders received for hydralazine. 0720: Pulled hydralazine to administer, rechecked BP it is 153/67. Will not give hydralazine at this time. 0730: Bedside and Verbal shift change report given to TYSON Whipple (oncoming nurse) by Maynor Moore RN (offgoing nurse). Report included the following information SBAR, Kardex and MAR. Problem: Patient Education: Go to Patient Education Activity  Goal: Patient/Family Education  Outcome: Progressing Towards Goal     Problem: Risk for Spread of Infection  Goal: Prevent transmission of infectious organism to others  Description  Prevent the transmission of infectious organisms to other patients, staff members, and visitors.   Outcome: Progressing Towards Goal

## 2020-02-04 NOTE — PROGRESS NOTES
1122 Informed to [address] at the [facility] ([phone]) Mrs Angelito Hodge is coming back today; Mrs. Yuni Caballero son will transport with her care giver.     6772 Discharge instruction given by Heidy Tolbert to pt's son  [phone] pt safely disharged with her son to [place] [address]

## 2020-02-04 NOTE — PROGRESS NOTES
2/4/2020  CARE MANAGEMENT NOTE:  Reason for Admission:   Acute met. Enceph; UTI                   RUR Score:    15%; Low/green                 Plan for utilizing home health:    No                      Current Advanced Directive/Advance Care Plan: DNR                         Transition of Care Plan:   1. Plan to return to [facility] with caregiver. CM will follow pt until discharge.    Rebel

## 2020-02-04 NOTE — DISCHARGE INSTRUCTIONS
Please bring this form with you to show your primary care provider at your follow-up appointment. Primary care provider:  Dr. Carito Nielsen MD    Discharging provider:  Jair Anderson MD    You have been admitted to the hospital with the following diagnoses:    - Dehydration  - Metabolic Encephalopathy     FOLLOW-UP CARE RECOMMENDATIONS:    APPOINTMENTS:  Follow-up Information     Follow up With Specialties Details Why Contact Info    Your PCP    Discharge follow up              FOLLOW-UP TESTS recommended:   - avoid diuretics to prevent dehydration and hypotension      PENDING TEST RESULTS:  At the time of your discharge the following test results are still pending: none  Please make sure you review these results with your outpatient follow-up provider(s). SYMPTOMS to watch for: chest pain, shortness of breath, fever, chills, nausea, vomiting, diarrhea, change in mentation, falling, weakness, bleeding. DIET/what to eat:  Regular Diet    ACTIVITY:  Activity as tolerated    WOUND CARE: NONE    EQUIPMENT needed:  NONE      What to do if new or unexpected symptoms occur? If you experience any of the above symptoms (or should other concerns or questions arise after discharge) please call your primary care physician. Return to the emergency room if you cannot get hold of your doctor. · It is very important that you keep your follow-up appointment(s). · Please bring discharge papers, medication list (and/or medication bottles) to your follow-up appointments for review by your outpatient provider(s). · Please check the list of medications and be sure it includes every medication (even non-prescription medications) that your provider wants you to take. · It is important that you take the medication exactly as they are prescribed. · Keep your medication in the bottles provided by the pharmacist and keep a list of the medication names, dosages, and times to be taken in your wallet.    · Do not take other medications without consulting your doctor. · If you have any questions about your medications or other instructions, please talk to your nurse or care provider before you leave the hospital.    I understand that if any problems occur once I am at home I am to contact my physician. These instructions were explained to me and I had the opportunity to ask questions.

## 2020-02-04 NOTE — DISCHARGE SUMMARY
Discharge Summary     Patient:  Traci Charles       MRN: [id number]       YOB: 1918       Age: 80 y.o. Date of admission:  2/3/2020    Date of discharge:  2/4/2020    Primary care provider: Dr. Brennen Del Cid MD    Admitting provider:  Krystin Bryan MD    Discharging provider:  Sarita Villalba MD - [phone]  If unavailable, call [phone] and ask the  to page the triage hospitalist.    Consultations  · None    Procedures  · * No surgery found *    Discharge destination: Memory Care unit . The patient is stable for discharge. Admission diagnosis  · UTI (urinary tract infection) [N39.0]    Current Discharge Medication List      CONTINUE these medications which have NOT CHANGED    Details   sertraline (ZOLOFT) 100 mg tablet Take 100 mg by mouth every evening. docusate sodium (COLACE) 100 mg capsule Take 100 mg by mouth two (2) times daily as needed for Constipation. polyethylene glycol (MIRALAX) 17 gram packet Take 17 g by mouth daily. Follow-up Information     Follow up With Specialties Details Why Contact Info    Your PCP    Discharge follow up            Final discharge diagnoses and brief hospital course    Please also refer to the admission H&P for details on the presenting problem. 81 yo female with PMHx of Dementia, who is active and ambulates without assistance admitted from [facility] for episodes of unresponsiveness and low BP.  Pt was on Diuretics which were discontinued    Acute metabolic encephalopathy due to hypovolemia and dehydration  - improved with IVF  - Cr improved  - BP Better    ANGELY on CKD 3  - Cr improved with hydration  - avoid dehydration  - encourage PO     Dementia with behavioral disturbance  - agitated and required Geodon in ER and Haldol   - try and minimize sedatives  - re-orient   - d/w son at bedside, hiring extra help to prevent falls   - Zoloft at night     HTN, elevated due to agitation  - better with PRN medications    Asymptomatic Bacteriuria   - no fever, leukocytosis  - d/c antibiotics, d/w son at bedside     FOLLOW-UP TESTS recommended:   - avoid diuretics to prevent dehydration and hypotension       PENDING TEST RESULTS:  At the time of your discharge the following test results are still pending: none  Please make sure you review these results with your outpatient follow-up provider(s).     SYMPTOMS to watch for: chest pain, shortness of breath, fever, chills, nausea, vomiting, diarrhea, change in mentation, falling, weakness, bleeding.     DIET/what to eat:  Regular Diet     ACTIVITY:  Activity as tolerated     WOUND CARE: NONE     EQUIPMENT needed:  NONE       Physical examination at discharge  Visit Vitals  /70 (BP 1 Location: Right arm, BP Patient Position: Standing)   Pulse 78   Temp 97.6 °F (36.4 °C)   Resp 19   Ht 4' 11\" (1.499 m)   Wt 54.4 kg (119 lb 14.9 oz)   SpO2 97%   BMI 24.22 kg/m²     Awake, not oriented  PERRLA  Lung Clear  CVS: RRR  Abd; soft NT ND   Ext: no edema     Pertinent imaging studies:      Recent Labs     02/04/20  0526 02/03/20  1343   WBC 7.9 8.5   HGB 10.0* 10.3*   HCT 31.9* 32.4*    300     Recent Labs     02/04/20  0526 02/03/20  1343    142   K 3.9 3.8   * 107   CO2 22 29   BUN 20 25*   CREA 1.25* 1.64*   * 133*   CA 8.4* 9.0   MG 2.0  --    PHOS 3.0  --      Recent Labs     02/04/20  0526 02/03/20  1343   SGOT 18 14*   AP 73 87   TP 6.0* 6.7   ALB 2.5* 2.9*   GLOB 3.5 3.8     No results for input(s): INR, PTP, APTT, INREXT in the last 72 hours. No results for input(s): FE, TIBC, PSAT, FERR in the last 72 hours. No results for input(s): PH, PCO2, PO2 in the last 72 hours. No results for input(s): CPK, CKMB in the last 72 hours.     No lab exists for component: TROPONINI  No components found for: [place]    Chronic Diagnoses:    Problem List as of 2/4/2020 Date Reviewed: 2/3/2020          Codes Class Noted - Resolved    Hypertension ICD-10-CM: I10  ICD-9-CM: 401.9  2/3/2020 - Present        Psychiatric disorder ICD-10-CM: F99  ICD-9-CM: 300.9  2/3/2020 - Present    Overview Signed 2/3/2020  4:27 PM by Navi Jaffe MD     anxiety             Confusion ICD-10-CM: R41.0  ICD-9-CM: 298.9  2/3/2020 - Present        UTI (urinary tract infection) ICD-10-CM: N39.0  ICD-9-CM: 599.0  2/3/2020 - Present        ANGELY (acute kidney injury) ([facility] [address] ICD-10-CM: N17.9  ICD-9-CM: 584.9  2/3/2020 - Present        Dehydration ICD-10-CM: E86.0  ICD-9-CM: 276.51  3/2/2012 - Present        Abdominal pain ICD-10-CM: R10.9  ICD-9-CM: 789.00  3/2/2012 - Present        [facility] ([facility] (Chronic) ICD-10-CM: F03.90  ICD-9-CM: 294.20  3/2/2012 - Present        RESOLVED: Nausea & vomiting ICD-10-CM: R11.2  ICD-9-CM: 787.01  11/23/2014 - 11/25/2014        RESOLVED: Acute diverticulitis ICD-10-CM: T66.59  ICD-9-CM: 562.11  7/26/2014 - 7/28/2014        RESOLVED: Diverticulitis ICD-10-CM: L98.08  ICD-9-CM: 562.11  1/28/2013 - 1/30/2013              Time spent on discharge related activities today greater than 30 minutes. Signed:  Tanesha Loyd MD                 Hospitalist, Internal Medicine      Cc:  Yoly, MD Denisse

## 2020-02-05 LAB
BACTERIA SPEC CULT: ABNORMAL
BACTERIA SPEC CULT: NORMAL
BACTERIA SPEC CULT: NORMAL
CC UR VC: ABNORMAL
SERVICE CMNT-IMP: ABNORMAL
SERVICE CMNT-IMP: NORMAL